# Patient Record
Sex: FEMALE | Race: WHITE | Employment: OTHER | URBAN - METROPOLITAN AREA
[De-identification: names, ages, dates, MRNs, and addresses within clinical notes are randomized per-mention and may not be internally consistent; named-entity substitution may affect disease eponyms.]

---

## 2017-05-09 ENCOUNTER — HOSPITAL ENCOUNTER (OUTPATIENT)
Dept: PREADMISSION TESTING | Age: 77
Discharge: HOME | End: 2017-05-09
Payer: MEDICARE

## 2017-05-09 ENCOUNTER — HOSPITAL ENCOUNTER (OUTPATIENT)
Dept: GENERAL RADIOLOGY | Age: 77
Discharge: HOME | End: 2017-05-09
Payer: MEDICARE

## 2017-05-09 VITALS
TEMPERATURE: 98.1 F | DIASTOLIC BLOOD PRESSURE: 81 MMHG | HEART RATE: 64 BPM | SYSTOLIC BLOOD PRESSURE: 146 MMHG | WEIGHT: 116 LBS | HEIGHT: 65 IN | BODY MASS INDEX: 19.33 KG/M2

## 2017-05-09 DIAGNOSIS — Z01.811 PRE-OP CHEST EXAM: ICD-10-CM

## 2017-05-09 LAB
ANION GAP BLD CALC-SCNC: 6 MMOL/L (ref 5–15)
ATRIAL RATE: 63 BPM
BUN SERPL-MCNC: 20 MG/DL (ref 6–20)
BUN/CREAT SERPL: 24 (ref 12–20)
CALCIUM SERPL-MCNC: 11.1 MG/DL (ref 8.5–10.1)
CALCULATED P AXIS, ECG09: 69 DEGREES
CALCULATED R AXIS, ECG10: -22 DEGREES
CALCULATED T AXIS, ECG11: 31 DEGREES
CHLORIDE SERPL-SCNC: 104 MMOL/L (ref 97–108)
CO2 SERPL-SCNC: 30 MMOL/L (ref 21–32)
CREAT SERPL-MCNC: 0.85 MG/DL (ref 0.55–1.02)
DIAGNOSIS, 93000: NORMAL
ERYTHROCYTE [DISTWIDTH] IN BLOOD BY AUTOMATED COUNT: 12.7 % (ref 11.5–14.5)
GLUCOSE SERPL-MCNC: 93 MG/DL (ref 65–100)
HCT VFR BLD AUTO: 41 % (ref 35–47)
HGB BLD-MCNC: 13.7 G/DL (ref 11.5–16)
MCH RBC QN AUTO: 31.6 PG (ref 26–34)
MCHC RBC AUTO-ENTMCNC: 33.4 G/DL (ref 30–36.5)
MCV RBC AUTO: 94.7 FL (ref 80–99)
P-R INTERVAL, ECG05: 174 MS
PLATELET # BLD AUTO: 298 K/UL (ref 150–400)
POTASSIUM SERPL-SCNC: 4.3 MMOL/L (ref 3.5–5.1)
Q-T INTERVAL, ECG07: 398 MS
QRS DURATION, ECG06: 78 MS
QTC CALCULATION (BEZET), ECG08: 407 MS
RBC # BLD AUTO: 4.33 M/UL (ref 3.8–5.2)
SODIUM SERPL-SCNC: 140 MMOL/L (ref 136–145)
VENTRICULAR RATE, ECG03: 63 BPM
WBC # BLD AUTO: 6.5 K/UL (ref 3.6–11)

## 2017-05-09 PROCEDURE — 36415 COLL VENOUS BLD VENIPUNCTURE: CPT | Performed by: COLON & RECTAL SURGERY

## 2017-05-09 PROCEDURE — 93005 ELECTROCARDIOGRAM TRACING: CPT

## 2017-05-09 PROCEDURE — 85027 COMPLETE CBC AUTOMATED: CPT | Performed by: COLON & RECTAL SURGERY

## 2017-05-09 PROCEDURE — 80048 BASIC METABOLIC PNL TOTAL CA: CPT | Performed by: COLON & RECTAL SURGERY

## 2017-05-09 PROCEDURE — 71020 XR CHEST PA LAT: CPT

## 2017-05-09 RX ORDER — ZINC GLUCONATE 10 MG
LOZENGE ORAL DAILY
COMMUNITY
End: 2022-03-23

## 2017-05-09 NOTE — PERIOP NOTES
PATIENT GIVEN \"FAQ'S ABOUT SURGICAL SITE INFECTIONS\" INFORMATION SHEET, EMPHASIZING THE IMPORTANCE OF GOOD HAND HYGIENE.   BOWEL PREP PER DR HODGSON

## 2017-05-10 NOTE — PERIOP NOTES
EKG done 5-9-17 reviewed by Dr. Donna Tucker - Anesthesia and approved for surgery scheduled 5-16-17.

## 2017-05-16 ENCOUNTER — ANESTHESIA EVENT (OUTPATIENT)
Dept: SURGERY | Age: 77
End: 2017-05-16
Payer: MEDICARE

## 2017-05-16 ENCOUNTER — ANESTHESIA (OUTPATIENT)
Dept: SURGERY | Age: 77
End: 2017-05-16
Payer: MEDICARE

## 2017-05-16 ENCOUNTER — HOSPITAL ENCOUNTER (OUTPATIENT)
Age: 77
Setting detail: OUTPATIENT SURGERY
Discharge: HOME | End: 2017-05-16
Attending: COLON & RECTAL SURGERY | Admitting: COLON & RECTAL SURGERY
Payer: MEDICARE

## 2017-05-16 VITALS
BODY MASS INDEX: 19.33 KG/M2 | DIASTOLIC BLOOD PRESSURE: 60 MMHG | HEIGHT: 65 IN | RESPIRATION RATE: 18 BRPM | WEIGHT: 116 LBS | OXYGEN SATURATION: 100 % | TEMPERATURE: 98 F | SYSTOLIC BLOOD PRESSURE: 120 MMHG | HEART RATE: 68 BPM

## 2017-05-16 PROCEDURE — 74011250636 HC RX REV CODE- 250/636

## 2017-05-16 PROCEDURE — 77030031139 HC SUT VCRL2 J&J -A: Performed by: COLON & RECTAL SURGERY

## 2017-05-16 PROCEDURE — 76210000021 HC REC RM PH II 0.5 TO 1 HR: Performed by: COLON & RECTAL SURGERY

## 2017-05-16 PROCEDURE — 76210000006 HC OR PH I REC 0.5 TO 1 HR: Performed by: COLON & RECTAL SURGERY

## 2017-05-16 PROCEDURE — 77030026438 HC STYL ET INTUB CARD -A: Performed by: ANESTHESIOLOGY

## 2017-05-16 PROCEDURE — 74011000250 HC RX REV CODE- 250: Performed by: COLON & RECTAL SURGERY

## 2017-05-16 PROCEDURE — 88304 TISSUE EXAM BY PATHOLOGIST: CPT | Performed by: COLON & RECTAL SURGERY

## 2017-05-16 PROCEDURE — 76060000033 HC ANESTHESIA 1 TO 1.5 HR: Performed by: COLON & RECTAL SURGERY

## 2017-05-16 PROCEDURE — 77030018836 HC SOL IRR NACL ICUM -A: Performed by: COLON & RECTAL SURGERY

## 2017-05-16 PROCEDURE — 77030008684 HC TU ET CUF COVD -B: Performed by: ANESTHESIOLOGY

## 2017-05-16 PROCEDURE — 76010000149 HC OR TIME 1 TO 1.5 HR: Performed by: COLON & RECTAL SURGERY

## 2017-05-16 PROCEDURE — 77030011640 HC PAD GRND REM COVD -A: Performed by: COLON & RECTAL SURGERY

## 2017-05-16 PROCEDURE — 74011000272 HC RX REV CODE- 272: Performed by: COLON & RECTAL SURGERY

## 2017-05-16 PROCEDURE — 74011250637 HC RX REV CODE- 250/637: Performed by: COLON & RECTAL SURGERY

## 2017-05-16 PROCEDURE — 77030002888 HC SUT CHRMC J&J -A: Performed by: COLON & RECTAL SURGERY

## 2017-05-16 PROCEDURE — 74011000250 HC RX REV CODE- 250

## 2017-05-16 PROCEDURE — 77030032490 HC SLV COMPR SCD KNE COVD -B: Performed by: COLON & RECTAL SURGERY

## 2017-05-16 RX ORDER — PHENYLEPHRINE HCL IN 0.9% NACL 0.4MG/10ML
SYRINGE (ML) INTRAVENOUS AS NEEDED
Status: DISCONTINUED | OUTPATIENT
Start: 2017-05-16 | End: 2017-05-16 | Stop reason: HOSPADM

## 2017-05-16 RX ORDER — SODIUM CHLORIDE 0.9 % (FLUSH) 0.9 %
5-10 SYRINGE (ML) INJECTION AS NEEDED
Status: DISCONTINUED | OUTPATIENT
Start: 2017-05-16 | End: 2017-05-16 | Stop reason: HOSPADM

## 2017-05-16 RX ORDER — KETOROLAC TROMETHAMINE 30 MG/ML
INJECTION, SOLUTION INTRAMUSCULAR; INTRAVENOUS AS NEEDED
Status: DISCONTINUED | OUTPATIENT
Start: 2017-05-16 | End: 2017-05-16 | Stop reason: HOSPADM

## 2017-05-16 RX ORDER — SODIUM CHLORIDE, SODIUM LACTATE, POTASSIUM CHLORIDE, CALCIUM CHLORIDE 600; 310; 30; 20 MG/100ML; MG/100ML; MG/100ML; MG/100ML
INJECTION, SOLUTION INTRAVENOUS
Status: DISCONTINUED | OUTPATIENT
Start: 2017-05-16 | End: 2017-05-16 | Stop reason: HOSPADM

## 2017-05-16 RX ORDER — FENTANYL CITRATE 50 UG/ML
25 INJECTION, SOLUTION INTRAMUSCULAR; INTRAVENOUS
Status: DISCONTINUED | OUTPATIENT
Start: 2017-05-16 | End: 2017-05-16 | Stop reason: HOSPADM

## 2017-05-16 RX ORDER — SODIUM CHLORIDE 0.9 % (FLUSH) 0.9 %
5-10 SYRINGE (ML) INJECTION EVERY 8 HOURS
Status: DISCONTINUED | OUTPATIENT
Start: 2017-05-16 | End: 2017-05-16 | Stop reason: HOSPADM

## 2017-05-16 RX ORDER — HYDROMORPHONE HYDROCHLORIDE 1 MG/ML
0.2 INJECTION, SOLUTION INTRAMUSCULAR; INTRAVENOUS; SUBCUTANEOUS
Status: DISCONTINUED | OUTPATIENT
Start: 2017-05-16 | End: 2017-05-16 | Stop reason: HOSPADM

## 2017-05-16 RX ORDER — MIDAZOLAM HYDROCHLORIDE 1 MG/ML
1 INJECTION, SOLUTION INTRAMUSCULAR; INTRAVENOUS AS NEEDED
Status: DISCONTINUED | OUTPATIENT
Start: 2017-05-16 | End: 2017-05-16 | Stop reason: HOSPADM

## 2017-05-16 RX ORDER — DEXAMETHASONE SODIUM PHOSPHATE 4 MG/ML
INJECTION, SOLUTION INTRA-ARTICULAR; INTRALESIONAL; INTRAMUSCULAR; INTRAVENOUS; SOFT TISSUE AS NEEDED
Status: DISCONTINUED | OUTPATIENT
Start: 2017-05-16 | End: 2017-05-16 | Stop reason: HOSPADM

## 2017-05-16 RX ORDER — LIDOCAINE HYDROCHLORIDE 20 MG/ML
INJECTION, SOLUTION EPIDURAL; INFILTRATION; INTRACAUDAL; PERINEURAL AS NEEDED
Status: DISCONTINUED | OUTPATIENT
Start: 2017-05-16 | End: 2017-05-16 | Stop reason: HOSPADM

## 2017-05-16 RX ORDER — DIPHENHYDRAMINE HYDROCHLORIDE 50 MG/ML
12.5 INJECTION, SOLUTION INTRAMUSCULAR; INTRAVENOUS AS NEEDED
Status: DISCONTINUED | OUTPATIENT
Start: 2017-05-16 | End: 2017-05-16 | Stop reason: HOSPADM

## 2017-05-16 RX ORDER — FENTANYL CITRATE 50 UG/ML
INJECTION, SOLUTION INTRAMUSCULAR; INTRAVENOUS AS NEEDED
Status: DISCONTINUED | OUTPATIENT
Start: 2017-05-16 | End: 2017-05-16 | Stop reason: HOSPADM

## 2017-05-16 RX ORDER — OXYCODONE HYDROCHLORIDE 5 MG/1
5-10 TABLET ORAL
Qty: 50 TAB | Refills: 0 | Status: SHIPPED | OUTPATIENT
Start: 2017-05-16 | End: 2022-03-23

## 2017-05-16 RX ORDER — SODIUM CHLORIDE, SODIUM LACTATE, POTASSIUM CHLORIDE, CALCIUM CHLORIDE 600; 310; 30; 20 MG/100ML; MG/100ML; MG/100ML; MG/100ML
75 INJECTION, SOLUTION INTRAVENOUS CONTINUOUS
Status: DISCONTINUED | OUTPATIENT
Start: 2017-05-16 | End: 2017-05-16 | Stop reason: HOSPADM

## 2017-05-16 RX ORDER — BUPIVACAINE HYDROCHLORIDE AND EPINEPHRINE 2.5; 5 MG/ML; UG/ML
30 INJECTION, SOLUTION EPIDURAL; INFILTRATION; INTRACAUDAL; PERINEURAL ONCE
Status: COMPLETED | OUTPATIENT
Start: 2017-05-16 | End: 2017-05-16

## 2017-05-16 RX ORDER — ROCURONIUM BROMIDE 10 MG/ML
INJECTION, SOLUTION INTRAVENOUS AS NEEDED
Status: DISCONTINUED | OUTPATIENT
Start: 2017-05-16 | End: 2017-05-16 | Stop reason: HOSPADM

## 2017-05-16 RX ORDER — PROPOFOL 10 MG/ML
INJECTION, EMULSION INTRAVENOUS AS NEEDED
Status: DISCONTINUED | OUTPATIENT
Start: 2017-05-16 | End: 2017-05-16 | Stop reason: HOSPADM

## 2017-05-16 RX ORDER — ACETAMINOPHEN 10 MG/ML
INJECTION, SOLUTION INTRAVENOUS AS NEEDED
Status: DISCONTINUED | OUTPATIENT
Start: 2017-05-16 | End: 2017-05-16 | Stop reason: HOSPADM

## 2017-05-16 RX ORDER — ROPIVACAINE HYDROCHLORIDE 5 MG/ML
30 INJECTION, SOLUTION EPIDURAL; INFILTRATION; PERINEURAL
Status: DISCONTINUED | OUTPATIENT
Start: 2017-05-16 | End: 2017-05-16 | Stop reason: HOSPADM

## 2017-05-16 RX ORDER — LIDOCAINE HYDROCHLORIDE 10 MG/ML
0.1 INJECTION, SOLUTION EPIDURAL; INFILTRATION; INTRACAUDAL; PERINEURAL AS NEEDED
Status: DISCONTINUED | OUTPATIENT
Start: 2017-05-16 | End: 2017-05-16 | Stop reason: HOSPADM

## 2017-05-16 RX ORDER — SODIUM CHLORIDE, SODIUM LACTATE, POTASSIUM CHLORIDE, CALCIUM CHLORIDE 600; 310; 30; 20 MG/100ML; MG/100ML; MG/100ML; MG/100ML
125 INJECTION, SOLUTION INTRAVENOUS CONTINUOUS
Status: DISCONTINUED | OUTPATIENT
Start: 2017-05-16 | End: 2017-05-16 | Stop reason: HOSPADM

## 2017-05-16 RX ORDER — MIDAZOLAM HYDROCHLORIDE 1 MG/ML
0.5 INJECTION, SOLUTION INTRAMUSCULAR; INTRAVENOUS
Status: DISCONTINUED | OUTPATIENT
Start: 2017-05-16 | End: 2017-05-16 | Stop reason: HOSPADM

## 2017-05-16 RX ORDER — SODIUM CHLORIDE 9 MG/ML
25 INJECTION, SOLUTION INTRAVENOUS CONTINUOUS
Status: DISCONTINUED | OUTPATIENT
Start: 2017-05-16 | End: 2017-05-16 | Stop reason: HOSPADM

## 2017-05-16 RX ORDER — SUCCINYLCHOLINE CHLORIDE 20 MG/ML
INJECTION INTRAMUSCULAR; INTRAVENOUS AS NEEDED
Status: DISCONTINUED | OUTPATIENT
Start: 2017-05-16 | End: 2017-05-16 | Stop reason: HOSPADM

## 2017-05-16 RX ORDER — MORPHINE SULFATE 10 MG/ML
2 INJECTION, SOLUTION INTRAMUSCULAR; INTRAVENOUS
Status: DISCONTINUED | OUTPATIENT
Start: 2017-05-16 | End: 2017-05-16 | Stop reason: HOSPADM

## 2017-05-16 RX ORDER — FENTANYL CITRATE 50 UG/ML
50 INJECTION, SOLUTION INTRAMUSCULAR; INTRAVENOUS AS NEEDED
Status: DISCONTINUED | OUTPATIENT
Start: 2017-05-16 | End: 2017-05-16 | Stop reason: HOSPADM

## 2017-05-16 RX ORDER — ONDANSETRON 2 MG/ML
INJECTION INTRAMUSCULAR; INTRAVENOUS AS NEEDED
Status: DISCONTINUED | OUTPATIENT
Start: 2017-05-16 | End: 2017-05-16 | Stop reason: HOSPADM

## 2017-05-16 RX ORDER — OXYCODONE HYDROCHLORIDE 5 MG/1
5 TABLET ORAL ONCE
Status: COMPLETED | OUTPATIENT
Start: 2017-05-16 | End: 2017-05-16

## 2017-05-16 RX ADMIN — Medication 80 MCG: at 18:05

## 2017-05-16 RX ADMIN — KETOROLAC TROMETHAMINE 30 MG: 30 INJECTION, SOLUTION INTRAMUSCULAR; INTRAVENOUS at 18:11

## 2017-05-16 RX ADMIN — SUCCINYLCHOLINE CHLORIDE 120 MG: 20 INJECTION INTRAMUSCULAR; INTRAVENOUS at 17:28

## 2017-05-16 RX ADMIN — PROPOFOL 100 MG: 10 INJECTION, EMULSION INTRAVENOUS at 17:28

## 2017-05-16 RX ADMIN — Medication 80 MCG: at 18:10

## 2017-05-16 RX ADMIN — FENTANYL CITRATE 50 MCG: 50 INJECTION, SOLUTION INTRAMUSCULAR; INTRAVENOUS at 17:28

## 2017-05-16 RX ADMIN — SODIUM CHLORIDE, SODIUM LACTATE, POTASSIUM CHLORIDE, CALCIUM CHLORIDE: 600; 310; 30; 20 INJECTION, SOLUTION INTRAVENOUS at 17:23

## 2017-05-16 RX ADMIN — DEXAMETHASONE SODIUM PHOSPHATE 4 MG: 4 INJECTION, SOLUTION INTRA-ARTICULAR; INTRALESIONAL; INTRAMUSCULAR; INTRAVENOUS; SOFT TISSUE at 17:43

## 2017-05-16 RX ADMIN — Medication 80 MCG: at 17:40

## 2017-05-16 RX ADMIN — ROCURONIUM BROMIDE 5 MG: 10 INJECTION, SOLUTION INTRAVENOUS at 17:28

## 2017-05-16 RX ADMIN — OXYCODONE HYDROCHLORIDE 5 MG: 5 TABLET ORAL at 18:50

## 2017-05-16 RX ADMIN — ACETAMINOPHEN 1000 MG: 10 INJECTION, SOLUTION INTRAVENOUS at 17:40

## 2017-05-16 RX ADMIN — PROPOFOL 50 MG: 10 INJECTION, EMULSION INTRAVENOUS at 17:35

## 2017-05-16 RX ADMIN — LIDOCAINE HYDROCHLORIDE 100 MG: 20 INJECTION, SOLUTION EPIDURAL; INFILTRATION; INTRACAUDAL; PERINEURAL at 17:28

## 2017-05-16 RX ADMIN — ONDANSETRON 4 MG: 2 INJECTION INTRAMUSCULAR; INTRAVENOUS at 17:43

## 2017-05-16 NOTE — H&P
History and Physical (outpatient)    Patient: Ruthie Alejandra 78 y.o. female     Referring Physician:  No ref. provider found    Chief Complaint:  Hemorrhoids. History of Present Illness: The patient is 51-year-old female who reports that she has been having problems with hemorrhoids since last year. They are uncomfortable and always protruding. She experiences hygiene problems. The level of discomfort fluctuates but has not been significantly reduced by the use of creams, suppositories, and sitz baths. She usually moves her bowels one once every other day. The stools are sometimes hard and she sometimes has to strain. Approximately once per week she uses an over-the-counter laxative. She saw a small amount of blood with some of her bowel movements in December and January, but she did not have any pain. She denies experiencing any fecal incontinence. She has had two colonoscopies within the last seven years. One was on 11/15/2010 and the other was on 6/15/2016. Hyperplastic polyps were removed during each of these procedures. Examination in the office on 5/4/2017 revealed significant hemorrhoidal enlargement consistent with the patient's symptoms. History:  Past Medical History:   Diagnosis Date    Arthritis     back, neck, hands    Chronic pain     fibromyalgia - neck/hands    Fibromyalgia     History of breast cancer 1997    Left breast cancer treated with surgery and radiation.  Hyperlipemia     Hypertension     Other ill-defined conditions     IBD - no problem with this at this time    Vaginal delivery     Four times. Four episiotomies.        Past Surgical History:   Procedure Laterality Date    BREAST SURGERY PROCEDURE UNLISTED  1997    L lumpectomy x2    COLONOSCOPY N/A 6/15/2016    COL[facility] ENDOSCOPY    HX APPENDECTOMY      HX GI      colonoscopies - last 2010    HX GYN  1989    hysterectomy    HX HEENT      childhood tonsillectomy    HX OTHER SURGICAL      Hemorrhoidectomy performed in a doctor's office circa 1992.  VASCULAR SURGERY PROCEDURE UNLIST  2000    vein sgy on L leg       Family History   Problem Relation Age of Onset    Heart Disease Mother     Cancer Father      colon    Colon Cancer Father     Heart Disease Brother      mi x2    Heart Attack Brother     Anesth Problems Neg Hx      Social History     Social History    Marital status:      Spouse name: N/A    Number of children: N/A    Years of education: N/A     Occupational History    Not on file. Social History Main Topics    Smoking status: Former Smoker     Packs/day: 0.25     Quit date: 11/3/1984    Smokeless tobacco: Never Used    Alcohol use 1.5 oz/week     3 Standard drinks or equivalent per week      Comment: occasional    Drug use: No    Sexual activity: Not on file     Other Topics Concern    Not on file     Social History Narrative       Allergies: Allergies   Allergen Reactions    Augmentin [Amoxicillin-Pot Clavulanate] Diarrhea    Indocin [Indomethacin Sodium] Nausea and Vomiting    Tolectin 600 Swelling       Current Medications:  Cannot display prior to admission medications because the patient has not been admitted in this contact. No current facility-administered medications for this encounter. Current Outpatient Prescriptions   Medication Sig    magnesium 250 mg tab Take  by mouth daily.  GLUC/CHND/OM3/DHA/EPA/FISH/STR (GLUCOSAMINE CHONDROITIN PLUS PO) Take  by mouth daily.  folic acid 535 mcg tablet Take 400 mcg by mouth daily.  DOCOSAHEXANOIC ACID/EPA (FISH OIL PO) Take 600 mg by mouth daily.  Potassium Gluconate 595 mg (99 mg) tablet Take 595 mg by mouth daily.  spironolactone (ALDACTONE) 50 mg tablet Take 50 mg by mouth daily.  MULTIVITAMIN PO Take  by mouth. Takes one po once daily.  LACTOBACILLUS ACIDOPHILUS (PROBIOTIC PO) Take  by mouth. Takes one po once daily.     metoprolol (LOPRESSOR) 50 mg tablet Take 50 mg by mouth daily. Indications: HYPERTENSION    atorvastatin (LIPITOR) 10 mg tablet Take 10 mg by mouth daily. Indications: HYPERCHOLESTEROLEMIA    aspirin (ASPIRIN) 325 mg tablet Take 650 mg by mouth as needed. Indications: MYOCARDIAL INFARCTION PREVENTION    ibuprofen (MOTRIN IB) 200 mg tablet Take 400-600 mg by mouth as needed. Indications: PAIN            Physical Exam:  There were no vitals taken for this visit. GENERAL:  No apparent distress. LUNGS:  Clear to auscultation bilaterally. HEART:  Regular rate and rhythm with no murmurs, gallops, or rubs. NEUROLOGIC: Alert and oriented. No gross deficits. Alerts:      Laboratory:    No results for input(s): HGB, HCT, WBC, PLT, INR, BUN, CREA, K, CRCLT, HGBEXT, HCTEXT, PLTEXT in the last 72 hours. No lab exists for component: PTT, PT, INREXT    Assessment and Plan:  Hemorrhoidectomy is indicated for relief of the patient's symptoms. The risks have been discussed in detail, and the patient has agreed to proceed.

## 2017-05-16 NOTE — PERIOP NOTES
Patient: Channing Delgado MRN: [id number]  SSN: [id number]   YOB: 1940  Age: 78 y.o. Sex: female     Patient is status post Procedure(s): HEMORRHOIDECTOMY . Surgeon(s) and Role:     * Gm Burleson MD - Primary    Local/Dose/Irrigation:  10mL 0.25% marcaine with epinephrine                  Peripheral IV 05/16/17 Left Arm (Active)   Site Assessment Clean, dry, & intact 5/16/2017  2:00 PM   Phlebitis Assessment 0 5/16/2017  2:00 PM   Infiltration Assessment 0 5/16/2017  2:00 PM   Dressing Status Clean, dry, & intact 5/16/2017  2:00 PM   Dressing Type Tape;Transparent 5/16/2017  2:00 PM   Hub Color/Line Status Pink; Infusing 5/16/2017  2:00 PM   Action Taken Open ports on tubing capped 5/16/2017  2:00 PM   Alcohol Cap Used Yes 5/16/2017  2:00 PM            Airway - Endotracheal Tube 05/16/17 Oral (Active)                   Dressing/Packing:  Wound Anus-DRESSING TYPE: 4 x 4;ABD pad;Xeroform (05/16/17 1815)  Splint/Cast:  ]    Other:

## 2017-05-16 NOTE — DISCHARGE INSTRUCTIONS
DISCHARGE SUMMARY from Nurse      PATIENT INSTRUCTIONS:    After general anesthesia or intravenous sedation, for 24 hours or while taking prescription Narcotics:  · Limit your activities  · Do not drive and operate hazardous machinery  · Do not make important personal or business decisions  · Do  not drink alcoholic beverages  · If you have not urinated within 8 hours after discharge, please contact your surgeon on call. Report the following to your surgeon:  · Excessive pain, swelling, redness or odor of or around the surgical area  · Temperature over 100.5  · Nausea and vomiting lasting longer than 4 hours or if unable to take medications  · Any signs of decreased circulation or nerve impairment to extremity: change in color, persistent  numbness, tingling, coldness or increase pain  · Any questions        What to do at Home:  Recommended activity: {discharge activity:81467}, ***    If you experience any of the following symptoms ***, please follow up with ***. *  Please give a list of your current medications to your Primary Care Provider. *  Please update this list whenever your medications are discontinued, doses are      changed, or new medications (including over-the-counter products) are added. *  Please carry medication information at all times in case of emergency situations. These are general instructions for a healthy lifestyle:    No smoking/ No tobacco products/ Avoid exposure to second hand smoke    Surgeon General's Warning:  Quitting smoking now greatly reduces serious risk to your health.     Obesity, smoking, and sedentary lifestyle greatly increases your risk for illness    A healthy diet, regular physical exercise & weight monitoring are important for maintaining a healthy lifestyle    You may be retaining fluid if you have a history of heart failure or if you experience any of the following symptoms:  Weight gain of 3 pounds or more overnight or 5 pounds in a week, increased swelling in our hands or feet or shortness of breath while lying flat in bed. Please call your doctor as soon as you notice any of these symptoms; do not wait until your next office visit. Recognize signs and symptoms of STROKE:    F-face looks uneven    A-arms unable to move or move unevenly    S-speech slurred or non-existent    T-time-call 911 as soon as signs and symptoms begin-DO NOT go       Back to bed or wait to see if you get better-TIME IS BRAIN. Warning Signs of HEART ATTACK     Call 911 if you have these symptoms:   Chest discomfort. Most heart attacks involve discomfort in the center of the chest that lasts more than a few minutes, or that goes away and comes back. It can feel like uncomfortable pressure, squeezing, fullness, or pain.  Discomfort in other areas of the upper body. Symptoms can include pain or discomfort in one or both arms, the back, neck, jaw, or stomach.  Shortness of breath with or without chest discomfort.  Other signs may include breaking out in a cold sweat, nausea, or lightheadedness. Don't wait more than five minutes to call 911 - MINUTES MATTER! Fast action can save your life. Calling 911 is almost always the fastest way to get lifesaving treatment. Emergency Medical Services staff can begin treatment when they arrive -- up to an hour sooner than if someone gets to the hospital by car. The discharge information has been reviewed with the {PATIENT PARENT GUARDIAN:45461}. The {PATIENT PARENT GUARDIAN:02267} verbalized understanding. Discharge medications reviewed with the {Dishcarge meds reviewed CZIL:21776} and appropriate educational materials and side effects teaching were provided. Post-Operative Instructions      1. Diet:  Consume a high fiber diet as tolerated. Such a diet may include fresh fruits, vegetables, and whole grain cereals and breads.   You should also drink 8-10 glasses of water, juice, or other non-alcoholic beverages per day. 2. Fiber Supplements:  Take 1 dose of Metamucil or other over-the-counter fiber supplement (Citrucel, BeneFiber, etc.) as directed each morning and another dose each evening. 3. Medications: Take pain medication as prescribed. Do not drive, drink alcohol, or operate machinery while taking prescription pain medication. Take docusate (non-prescription stool softener) twice per day as directed. Beginning on the second day after surgery, you may take ibuprofen as directed in addition to or instead of the prescription medication if there has been no significant bleeding. Do not take pain medication on an empty stomach. Do not take aspirin for 10 days following the procedure. 4. Activity:  Do not engage in any heavy lifting or other strenuous activity until you have been seen for follow-up. You may walk as much as you want, and you may climb stairs. Frequent walking will help prevent constipation. 5. Sitz Baths (soaking):  Remove the dressing on the first day after surgery or just before your next bowel movement (whichever comes first), then begin performing sitz baths 3 times per day and after bowel movements. The water should be as hot as you can tolerate, and you should soak for no longer than 20 minutes at a time. Do not wipe the anal area with dry toilet tissue; instead, use baby wipes. After sitz baths, cover the anal area with a gauze dressing. You may also apply Neosporin, Neosporin + Pain Relief, or a non-prescription a topical anesthetic such as lidocaine or dibucaine. 6. Constipation:  If more than one day passes without a bowel movement, take 1 Tablespoon of Milk of Magnesia. Repeat in 6 hours if you have no results. If taking the second dose of the Milk of Magnesia does not result in a bowel movement, take two to four 5 mg Dulcolax (bisacodyl) tablets. If you have not had a bowel movement by the next morning after taking the bisacodyl, call my office.     7. Bleeding:  A small amount of bright red bleeding can be expected for the next several days. If bleeding appears to be continuous, call my office. 8. Other Problems:  If you experience intolerable pain, fever (temperature of 101 or more), or inability to urinate, call my office. 9. Questions: If you have any questions about your post-operative condition or care, do not hesitate to call my office. 10. Other: For anything other than scheduling, please call [phone]. 11.  Follow-up:  Please return to my office at 9:30 AM on Tuesday 5/30/2017. If you need to change the appointment, please call [phone] on a weekday between 9:00 AM and noon.       Casie Damian M.D.  [phone]

## 2017-05-16 NOTE — BRIEF OP NOTE
BRIEF OPERATIVE NOTE    Date of Procedure:  5/16/2017   Preoperative Diagnosis:  Hemorrhoids. Postoperative Diagnosis:  Hemorrhoids. Procedure:  Internal and external hemorrhoidectomies. Surgeon:  Cindy Lyon MD  Assistant:  Larry Moon  Anesthesia:  General endotracheal  Estimated Blood Loss:  25 mL  Crystalloid:  300 mL    Specimens:   ID Type Source Tests Collected by Time Destination   1 : Left Lateral Hemorrhoid Fresh Anus  Cindy Lyon MD 5/16/2017 1752 Pathology   2 : Right Posterior Hemorrhoid Fresh Anus  Cindy Lyon MD 5/16/2017 1801 Pathology     Tubes and Drains:  None. Findings: Somewhat enlarged internal and external hemorrhoids in the left lateral, anterior, and right posterior positions. Complications: None apparent.

## 2017-05-16 NOTE — PERIOP NOTES
Phylicia [phone] to patient's  (Rekha Cornelius) to update him about the patient's surgical status at start of procedure.

## 2017-05-16 NOTE — IP AVS SNAPSHOT
[address] [place] [address] 
[phone] Patient: Jessica Thompson 
MRN: [id number] MD You are allergic to the following Allergen Reactions Augmentin (Amoxicillin-Pot Clavulanate) Diarrhea Indocin (Indomethacin Sodium) Nausea and Vomiting Tolectin 600 Swelling Recent Documentation Height Weight BMI OB Status Smoking Status 1.651 m 52.6 kg 19.3 kg/m2 Hysterectomy Former Smoker Emergency Contacts Name Discharge Info Relation Home Work Mobile Neal Amaro DISCHARGE CAREGIVER [3] Spouse [3] [phone] About your hospitalization You were admitted on:  May 16, 2017 You last received care in the:  [address] [place] [address] PACU You were discharged on:  May 16, 2017 Unit phone number:  [phone] Why you were hospitalized Your primary diagnosis was:  Not on File Providers Seen During Your Hospitalizations Provider Role Specialty Primary office phone Alfred Delgado MD Attending Provider Colon and Rectal Surgery [phone] Your Primary Care Physician (PCP) Primary Care Physician Office Phone Office Fax OTHER, PHYS ** None ** ** None ** Follow-up Information Follow up With Details Comments Contact Info Aileen Chaney MD   Patient can only remember the practice name and not the physician Alfred Delgado MD Go on 2017 Appointment time 9:30AM [address] [place] [address] [address] 
[phone] Current Discharge Medication List  
  
START taking these medications Dose & Instructions Dispensing Information Comments Morning Noon Evening Bedtime  
 oxyCODONE IR 5 mg immediate release tablet Commonly known as:  Aster Pena Your last dose was: Your next dose is:    
   
   
 Dose:  5-10 mg Take 1-2 Tabs by mouth every four (4) hours as needed for Pain.  Max Daily Amount: 60 mg.  
 Quantity:  50 Tab Refills:  0 CONTINUE these medications which have NOT CHANGED Dose & Instructions Dispensing Information Comments Morning Noon Evening Bedtime  
 folic acid 039 mcg tablet Your last dose was: Your next dose is:    
   
   
 Dose:  400 mcg Take 400 mcg by mouth daily. Refills:  0 GLUCOSAMINE CHONDROITIN PLUS PO Your last dose was: Your next dose is: Take  by mouth daily. Refills:  0 LIPITOR 10 mg tablet Generic drug:  atorvastatin Your last dose was: Your next dose is:    
   
   
 Dose:  10 mg Take 10 mg by mouth daily. Indications: HYPERCHOLESTEROLEMIA Refills:  0  
     
   
   
   
  
 magnesium 250 mg Tab Your last dose was: Your next dose is: Take  by mouth daily. Refills:  0  
     
   
   
   
  
 metoprolol tartrate 50 mg tablet Commonly known as:  LOPRESSOR Your last dose was: Your next dose is:    
   
   
 Dose:  50 mg Take 50 mg by mouth daily. Indications: HYPERTENSION Refills:  0 MULTIVITAMIN PO Your last dose was: Your next dose is: Take  by mouth. Takes one po once daily. Refills:  0 Potassium Gluconate 595 mg (99 mg) tablet Your last dose was: Your next dose is:    
   
   
 Dose:  595 mg Take 595 mg by mouth daily. Refills:  0 PROBIOTIC PO Your last dose was: Your next dose is: Take  by mouth. Takes one po once daily. Refills:  0  
     
   
   
   
  
 spironolactone 50 mg tablet Commonly known as:  ALDACTONE Your last dose was: Your next dose is:    
   
   
 Dose:  50 mg Take 50 mg by mouth daily. Refills:  0 STOP taking these medications aspirin 325 mg tablet Commonly known as:  ASPIRIN  
   
  
 FISH OIL PO  
   
  
 MOTRIN  mg tablet Generic drug:  ibuprofen Where to Get Your Medications Information on where to get these meds will be given to you by the nurse or doctor. ! Ask your nurse or doctor about these medications  
  oxyCODONE IR 5 mg immediate release tablet Discharge Instructions DISCHARGE SUMMARY from Nurse PATIENT INSTRUCTIONS: 
 
 
F-face looks uneven A-arms unable to move or move unevenly S-speech slurred or non-existent T-time-call 911 as soon as signs and symptoms begin-DO NOT go Back to bed or wait to see if you get better-TIME IS BRAIN. Warning Signs of HEART ATTACK Call 911 if you have these symptoms: 
? Chest discomfort. Most heart attacks involve discomfort in the center of the chest that lasts more than a few minutes, or that goes away and comes back. It can feel like uncomfortable pressure, squeezing, fullness, or pain. ? Discomfort in other areas of the upper body. Symptoms can include pain or discomfort in one or both arms, the back, neck, jaw, or stomach. ? Shortness of breath with or without chest discomfort. ? Other signs may include breaking out in a cold sweat, nausea, or lightheadedness. Don't wait more than five minutes to call [address]! Fast action can save your life. Calling 911 is almost always the fastest way to get lifesaving treatment. Emergency Medical Services staff can begin treatment when they arrive  up to an hour sooner than if someone gets to the hospital by car.   
 
 
The discharge information has been reviewed with the {PATIENT PARENT GUARDIAN:58737}. The {PATIENT PARENT GUARDIAN:72811} verbalized understanding. Discharge medications reviewed with the {Dishcarge meds reviewed PDQE:07036} and appropriate educational materials and side effects teaching were provided. Post-Operative Instructions 1. Diet:  Consume a high fiber diet as tolerated. Such a diet may include fresh fruits, vegetables, and whole grain cereals and breads. You should also drink 8-10 glasses of water, juice, or other non-alcoholic beverages per day. 2. Fiber Supplements:  Take 1 dose of Metamucil or other over-the-counter fiber supplement (Citrucel, BeneFiber, etc.) as directed each morning and another dose each evening. 3. Medications: Take pain medication as prescribed. Do not drive, drink alcohol, or operate machinery while taking prescription pain medication. Take docusate (non-prescription stool softener) twice per day as directed. Beginning on the second day after surgery, you may take ibuprofen as directed in addition to or instead of the prescription medication if there has been no significant bleeding. Do not take pain medication on an empty stomach. Do not take aspirin for 10 days following the procedure. 4. Activity:  Do not engage in any heavy lifting or other strenuous activity until you have been seen for follow-up. You may walk as much as you want, and you may climb stairs. Frequent walking will help prevent constipation. 5. Sitz Baths (soaking):  Remove the dressing on the first day after surgery or just before your next bowel movement (whichever comes first), then begin performing sitz baths 3 times per day and after bowel movements. The water should be as hot as you can tolerate, and you should soak for no longer than 20 minutes at a time. Do not wipe the anal area with dry toilet tissue; instead, use baby wipes. After sitz baths, cover the anal area with a gauze dressing.   You may also apply Neosporin, Neosporin + Pain Relief, or a non-prescription a topical anesthetic such as lidocaine or dibucaine. 6. Constipation:  If more than one day passes without a bowel movement, take 1 Tablespoon of Milk of Magnesia. Repeat in 6 hours if you have no results. If taking the second dose of the Milk of Magnesia does not result in a bowel movement, take two to four 5 mg Dulcolax (bisacodyl) tablets. If you have not had a bowel movement by the next morning after taking the bisacodyl, call my office. 7. Bleeding:  A small amount of bright red bleeding can be expected for the next several days. If bleeding appears to be continuous, call my office. 8. Other Problems:  If you experience intolerable pain, fever (temperature of 101 or more), or inability to urinate, call my office. 9. Questions: If you have any questions about your post-operative condition or care, do not hesitate to call my office. 10. Other: For anything other than scheduling, please call [phone]. 11.  Follow-up:  Please return to my office at 9:30 AM on Tuesday 5/30/2017. If you need to change the appointment, please call [phone] on a weekday between 9:00 AM and noon. Ashwin García M.D. 
[phone] Discharge Orders None Introducing [facility]! [employer] introduces [employer] patient portal. Now you can access parts of your medical record, email your doctor's office, and request medication refills online. 1. In your internet browser, go to [email or web address] 2. Click on the First Time User? Click Here link in the Sign In box. You will see the New Member Sign Up page. 3. Enter your [employer] Access Code exactly as it appears below. You will not need to use this code after youve completed the sign-up process. If you do not sign up before the expiration date, you must request a new code. · [employer] Access Code: [email or web address] Expires: 8/7/2017  1:47 PM 
 
 4. Enter the last four digits of your Social Security Number (xxxx) and Date of Birth (mm/dd/yyyy) as indicated and click Submit. You will be taken to the next sign-up page. 5. Create a [employer] ID. This will be your [employer] login ID and cannot be changed, so think of one that is secure and easy to remember. 6. Create a [email or web address] password. You can change your password at any time. 7. Enter your Password Reset Question and Answer. This can be used at a later time if you forget your password. 8. Enter your e-mail address. You will receive e-mail notification when new information is available in [address] 9. Click Sign Up. You can now view and download portions of your medical record. 10. Click the Download Summary menu link to download a portable copy of your medical information. If you have questions, please visit the Frequently Asked Questions section of the [email or web address] website. Remember, [employer] is NOT to be used for urgent needs. For medical emergencies, dial 911. Now available from your iPhone and Android! General Information Please provide this summary of care documentation to your next provider. Patient Signature:  ____________________________________________________________ Date:  ____________________________________________________________  
  
Karen Brought Provider Signature:  ____________________________________________________________ Date:  ____________________________________________________________

## 2017-05-17 NOTE — ANESTHESIA POSTPROCEDURE EVALUATION
Post-Anesthesia Evaluation and Assessment    Patient: Kesha Ba MRN: [id number]  SSN: [id number]    YOB: 1940  Age: 78 y.o. Sex: female       Cardiovascular Function/Vital Signs  Visit Vitals    /60 (BP 1 Location: Right arm, BP Patient Position: At rest)    Pulse 68    Temp 36.7 °C (98 °F)    Resp 18    Ht 5' 5\" (1.651 m)    Wt 52.6 kg (116 lb)    SpO2 100%    BMI 19.3 kg/m2       Patient is status post general anesthesia for Procedure(s): HEMORRHOIDECTOMY . Nausea/Vomiting: None    Postoperative hydration reviewed and adequate. Pain:  Pain Scale 1: Numeric (0 - 10) (05/16/17 1951)  Pain Intensity 1: 0 (05/17/17 0939)   Managed    Neurological Status:   Neuro (WDL): Within Defined Limits (05/16/17 1900)  Neuro  Neurologic State: Alert (05/16/17 1900)  LUE Motor Response: Purposeful (05/16/17 1900)  LLE Motor Response: Purposeful (05/16/17 1900)  RUE Motor Response: Purposeful (05/16/17 1900)  RLE Motor Response: Purposeful (05/16/17 1900)   At baseline    Mental Status and Level of Consciousness: Arousable    Pulmonary Status:   O2 Device: Room air (05/16/17 1951)   Adequate oxygenation and airway patent    Complications related to anesthesia: None    Post-anesthesia assessment completed.  No concerns    Signed By: Adrián Bell MD     May 17, 2017

## 2017-05-18 NOTE — OP NOTES
[address]   [address] [facility], [address] [place] [address]   OP NOTE       Name:  Scott Corrigan   MR#:  [id number]   :  1940   Account #:  [de-identified]    Surgery Date:  2017   Date of Adm:  2017       PREOPERATIVE DIAGNOSIS: Hemorrhoids. POSTOPERATIVE DIAGNOSIS: Hemorrhoids. PROCEDURE PERFORMED: Internal and external hemorrhoidectomy. SURGEON: Hermelinda Donaldson MD.    ASSISTANT: Larry Lizama.    ANESTHESIA: General endotracheal.      SPECIMENS REMOVED:   1. Left lateral hemorrhoid. 2. Right posterior hemorrhoid. TUBES AND DRAINS: None. ESTIMATED BLOOD LOSS: 25 mL. CRYSTALLOID: 300 mL. INDICATIONS FOR PROCEDURE: The patient is a 82-year-old female who reports that she has been having problems with hemorrhoids since last year. They are uncomfortable and always protruding. She experiences hygiene problems. The level of discomfort fluctuates, but it has not been significantly reduced by the use of creams, suppositories and sitz baths. She usually moves her bowels once every other day. The stools are sometimes hard, and she sometimes has to strain. Approximately once per week she uses an over-the-counter laxative. She saw a small amount of blood with some of the bowel movements in December and January, she did not have any pain. She denies experiencing any fecal incontinence. She has had two colonoscopies within the last 7 years. One was on 11/15/2010 and the other was on 06/15/2016. Hyperplastic polyps were removed during each of these procedures. Examination in the office on 2015 revealed significant hemorrhoidal enlargement consistent with the patient's symptoms. Hemorrhoidectomy was indicated for relief of the patient's symptoms. The risks were discussed in detail, and the patient agreed to proceed.     OPERATIVE FINDINGS: There were somewhat enlarged internal and external hemorrhoids in the left lateral, anterior, and right posterior positions. DESCRIPTION OF PROCEDURE: After informed consent was obtained, the patient was taken to the operating room where standard monitoring devices were attached and adequate general endotracheal anesthesia was induced. She was then placed in the prone jackknife position on the operating table with all pressure points padded appropriately and with the lower extremities fitted with pneumatic compression stockings. The buttocks were retracted bilaterally using Mastisol and tape. The perineum was prepped and draped in the usual sterile fashion. Visual inspection and digital rectal examination were performed. Exposure within the anal canal was provided using the large Hill-Hdz retractor. The disease on the patient's left side was addressed first. Here, in the lateral position, the internal hemorrhoidal component was controlled by placing a 3-0 Vicryl figure-of-eight suture proximally. Sharp excision was then performed using scalpel and scissors to include both the internal and the external component. External cutaneous redundancy was reduced by trimming the skin once the subcutaneous portion of the dissection had been performed. The wound was closed using running interrupted 3-0 Vicryl sutures. Moving to the right posterior position, the same basic technique was employed to remove a similar amount of tissue. In this case, there was perhaps a bit more of an external component, and more of the skin was removed in order to allow for the closure of the wound without leaving a significant amount of redundancy. The wound was once again closed with running and interrupted 3-0 Vicryl sutures. The hemorrhoids in the anterior quadrant that were located within the anal canal, but distal to the dentate line, were dealt with by suture ligation using 3-0 Vicryl. The ligation was performed instead of excision in order to preserve more tissue and hopefully minimize the unpleasantness of the recovery.     Final inspection revealed good hemostasis. 0.25% Bupivacaine with epinephrine was infiltrated circumanally to provide some postoperative analgesia. Three small strips of Gelfoam were inserted into the anal canal, and an external dressing consisting of Xeroform, 4 x 4s, and an ABD was put in place. There were no apparent complications, and the patient appeared to have tolerated the procedure well. At its conclusion, she was extubated and transported in stable condition to the recovery room.         Bruno Lim MD PG / Jessica   D:  05/17/2017   23:22   T:  05/18/2017   09:04   Job #:  [id number]

## 2017-08-15 ENCOUNTER — HOSPITAL ENCOUNTER (OUTPATIENT)
Dept: MAMMOGRAPHY | Age: 77
Discharge: HOME | End: 2017-08-15
Attending: FAMILY MEDICINE
Payer: MEDICARE

## 2017-08-15 DIAGNOSIS — Z12.31 VISIT FOR SCREENING MAMMOGRAM: ICD-10-CM

## 2017-08-15 PROCEDURE — 77067 SCR MAMMO BI INCL CAD: CPT

## 2018-08-15 ENCOUNTER — HOSPITAL ENCOUNTER (OUTPATIENT)
Dept: MAMMOGRAPHY | Age: 78
Discharge: HOME | End: 2018-08-15
Payer: MEDICARE

## 2018-08-15 DIAGNOSIS — Z12.31 VISIT FOR SCREENING MAMMOGRAM: ICD-10-CM

## 2018-08-15 PROCEDURE — 77067 SCR MAMMO BI INCL CAD: CPT

## 2019-08-22 ENCOUNTER — HOSPITAL ENCOUNTER (OUTPATIENT)
Dept: MAMMOGRAPHY | Age: 79
Discharge: HOME | End: 2019-08-22
Attending: FAMILY MEDICINE
Payer: MEDICARE

## 2019-08-22 DIAGNOSIS — Z12.31 VISIT FOR SCREENING MAMMOGRAM: ICD-10-CM

## 2019-08-22 PROCEDURE — 77063 BREAST TOMOSYNTHESIS BI: CPT

## 2020-08-25 ENCOUNTER — HOSPITAL ENCOUNTER (OUTPATIENT)
Dept: MAMMOGRAPHY | Age: 80
Discharge: HOME | End: 2020-08-25
Attending: FAMILY MEDICINE
Payer: MEDICARE

## 2020-08-25 DIAGNOSIS — Z12.31 VISIT FOR SCREENING MAMMOGRAM: ICD-10-CM

## 2020-08-25 PROCEDURE — 77063 BREAST TOMOSYNTHESIS BI: CPT

## 2020-10-30 ENCOUNTER — TRANSCRIBE ORDER (OUTPATIENT)
Dept: SCHEDULING | Age: 80
End: 2020-10-30

## 2020-10-30 DIAGNOSIS — Z12.31 VISIT FOR SCREENING MAMMOGRAM: Primary | ICD-10-CM

## 2021-08-26 ENCOUNTER — HOSPITAL ENCOUNTER (OUTPATIENT)
Dept: MAMMOGRAPHY | Age: 81
Discharge: HOME | End: 2021-08-26
Attending: FAMILY MEDICINE
Payer: MEDICARE

## 2021-08-26 DIAGNOSIS — Z12.31 VISIT FOR SCREENING MAMMOGRAM: ICD-10-CM

## 2021-08-26 PROCEDURE — 77063 BREAST TOMOSYNTHESIS BI: CPT

## 2022-02-15 ENCOUNTER — HOSPITAL ENCOUNTER (OUTPATIENT)
Dept: LAB | Age: 82
Discharge: HOME | End: 2022-02-15

## 2022-02-15 ENCOUNTER — DOCUMENTATION ONLY (OUTPATIENT)
Dept: SURGERY | Age: 82
End: 2022-02-15

## 2022-02-15 ENCOUNTER — OFFICE VISIT (OUTPATIENT)
Dept: SURGERY | Age: 82
End: 2022-02-15
Payer: MEDICARE

## 2022-02-15 VITALS — BODY MASS INDEX: 20.14 KG/M2 | WEIGHT: 118 LBS | HEIGHT: 64 IN

## 2022-02-15 DIAGNOSIS — R93.89 ABNORMAL ULTRASOUND: Primary | ICD-10-CM

## 2022-02-15 DIAGNOSIS — N63.20 LEFT BREAST MASS: ICD-10-CM

## 2022-02-15 PROBLEM — C50.912 BREAST CANCER, LEFT (HCC): Status: ACTIVE | Noted: 2022-02-15

## 2022-02-15 PROCEDURE — 1090F PRES/ABSN URINE INCON ASSESS: CPT | Performed by: SURGERY

## 2022-02-15 PROCEDURE — G8536 NO DOC ELDER MAL SCRN: HCPCS | Performed by: SURGERY

## 2022-02-15 PROCEDURE — 19083 BX BREAST 1ST LESION US IMAG: CPT | Performed by: SURGERY

## 2022-02-15 PROCEDURE — G8427 DOCREV CUR MEDS BY ELIG CLIN: HCPCS | Performed by: SURGERY

## 2022-02-15 PROCEDURE — 99203 OFFICE O/P NEW LOW 30 MIN: CPT | Performed by: SURGERY

## 2022-02-15 PROCEDURE — G8432 DEP SCR NOT DOC, RNG: HCPCS | Performed by: SURGERY

## 2022-02-15 PROCEDURE — G8400 PT W/DXA NO RESULTS DOC: HCPCS | Performed by: SURGERY

## 2022-02-15 PROCEDURE — G8420 CALC BMI NORM PARAMETERS: HCPCS | Performed by: SURGERY

## 2022-02-15 PROCEDURE — 1101F PT FALLS ASSESS-DOCD LE1/YR: CPT | Performed by: SURGERY

## 2022-02-15 NOTE — PROGRESS NOTES
[facility]  OFFICE PROCEDURE PROGRESS NOTE        Chart reviewed for the following:   I, Dr. Louann Landau, have reviewed the History, Physical and updated the Allergic reactions for [facility] [address] [place] [address] performed immediately prior to start of procedure:   I, Dr. Louann Landau, have performed the following reviews on Isaac Garay prior to the start of the procedure:            * Patient was identified by name and date of birth   * Agreement on procedure being performed was verified  * Risks and Benefits explained to the patient  * Procedure site verified and marked as necessary  * Patient was positioned for comfort  * Consent was signed and verified     Time: 3:40pm       Date of procedure: 2/15/2022    Procedure performed by:  Louann Landau, MD    Provider assisted by: Lubna Carey MA    Patient assisted by: nursing attendant    How tolerated by patient: tolerated the procedure well with no complications    Post Procedural Pain Scale: 0 - No Hurt    Comments: none, Patient tolerated the procedure well without complications. Denies pain post biopsy.

## 2022-02-15 NOTE — PROGRESS NOTES
HISTORY OF PRESENT ILLNESS  Jesus Camarillo is a [de-identified] y.o. female. HPI NEW patient consult referred by  Dr. Ning Rosas for LEFT breast mass near the nipple. She hit the breast with a box while moving things but that was under her breast.     1997 LEFT lumpectomy, XRT    Family History:  Father had Colon cancer     Breast imaging-   [facility] Results (most recent):  Results from Hospital Encounter encounter on 08/26/21    [facility] 3D NISH W MAMMO BI SCREENING INCL CAD    Narrative  STUDY: Bilateral digital screening mammogram with 3-D tomosynthesis    INDICATION:  Screening. COMPARISON: Prior studies dating back to 2012    BREAST COMPOSITION: The breasts are heterogeneously dense, which may obscure  small masses. FINDINGS: Bilateral digital screening mammography was performed and is  interpreted in conjunction with a computer assisted detection (CAD) system. Additionally, tomosynthesis of both breasts in the CC and MLO projections was  performed. No suspicious masses or calcifications are identified. There are  chronic lumpectomy changes in the upper outer left breast. There has been no  significant change. Impression  BI-RADS 2: Benign. No mammographic evidence of malignancy. RECOMMENDATIONS:  Next screening mammogram is recommended in one year. The patient will be notified of these results. Past Medical History:   Diagnosis Date    Arthritis     back, neck, hands    Breast cancer ([address] 1997    Chronic pain     fibromyalgia - neck/hands    Fibromyalgia     History of breast cancer 1997    Left breast cancer treated with surgery and radiation.  Hyperlipemia     Hypertension     Other ill-defined conditions(799.89)     IBD - no problem with this at this time    S/P radiation therapy 1997    Vaginal delivery     Four times. Four episiotomies.      Past Surgical History:   Procedure Laterality Date    COLONOSCOPY N/A 6/15/2016    COLONOSCOPY performed by Zahida Cabral MD at [address] HX APPENDECTOMY      HX BREAST BIOPSY Left     stereo  benign    HX BREAST LUMPECTOMY Left     Ca.  HX GI      colonoscopies - last     HX GYN  1989    hysterectomy    HX HEENT      childhood tonsillectomy    HX OTHER SURGICAL      Hemorrhoidectomy performed in a doctor's office [address]  SC BREAST SURGERY PROCEDURE UNLISTED      L lumpectomy x2    VASCULAR SURGERY PROCEDURE UNLIST      vein sgy on L leg      OB History    No obstetric history on file. Family History   Problem Relation Age of Onset    Heart Disease Mother     Cancer Father         colon    Colon Cancer Father     Heart Disease Brother         mi x2    Heart Attack Brother     Anesth Problems Neg Hx      Social History     Tobacco Use    Smoking status: Former Smoker     Packs/day: 0.25     Quit date: 11/3/1984     Years since quittin.3    Smokeless tobacco: Never Used   Substance Use Topics    Alcohol use: Yes     Alcohol/week: 2.5 standard drinks     Types: 3 Standard drinks or equivalent per week     Comment: occasional      Prior to Admission medications    Medication Sig Start Date End Date Taking? Authorizing Provider   MULTIVITAMIN PO Take  by mouth. Takes one po once daily. Yes Provider, Historical   atorvastatin (LIPITOR) 10 mg tablet Take 10 mg by mouth daily. Indications: HYPERCHOLESTEROLEMIA   Yes Provider, Historical   oxyCODONE IR (ROXICODONE) 5 mg immediate release tablet Take 1-2 Tabs by mouth every four (4) hours as needed for Pain. Max Daily Amount: 60 mg. Patient not taking: Reported on 2/15/2022 5/16/17   Luci Mills MD   magnesium 250 mg tab Take  by mouth daily. Patient not taking: Reported on 2/15/2022    Provider, Historical   GLUC/CHND/OM3/DHA/EPA/FISH/STR (GLUCOSAMINE CHONDROITIN PLUS PO) Take  by mouth daily. Patient not taking: Reported on 2/15/2022    Provider, Historical   folic acid 748 mcg tablet Take 400 mcg by mouth daily.   Patient not taking: Reported on 2/15/2022    Provider, Historical   Potassium Gluconate 595 mg (99 mg) tablet Take 595 mg by mouth daily. Patient not taking: Reported on 2/15/2022    Provider, Historical   spironolactone (ALDACTONE) 50 mg tablet Take 50 mg by mouth daily. Patient not taking: Reported on 2/15/2022    Provider, Historical   LACTOBACILLUS ACIDOPHILUS (PROBIOTIC PO) Take  by mouth. Takes one po once daily. Patient not taking: Reported on 2/15/2022    Provider, Historical   metoprolol (LOPRESSOR) 50 mg tablet Take 50 mg by mouth daily. Indications: HYPERTENSION  Patient not taking: Reported on 2/15/2022 11/3/10   Provider, Historical      Allergies   Allergen Reactions    Augmentin [Amoxicillin-Pot Clavulanate] Diarrhea    Indocin [Indomethacin Sodium] Nausea and Vomiting    Tolectin 600 Swelling       ROS    Physical Exam  Constitutional:       Appearance: She is well-developed. Cardiovascular:      Rate and Rhythm: Normal rate and regular rhythm. Heart sounds: Normal heart sounds. Pulmonary:      Effort: Pulmonary effort is normal.      Breath sounds: Normal breath sounds. Chest:   Breasts: Breasts are symmetrical.      Right: No swelling, mass, nipple discharge, skin change, tenderness, axillary adenopathy or supraclavicular adenopathy. Left: Mass (dense mass 11:00/periareolar with skin retraction) present. No swelling, nipple discharge, skin change, tenderness, axillary adenopathy or supraclavicular adenopathy. Lymphadenopathy:      Upper Body:      Right upper body: No supraclavicular or axillary adenopathy. Left upper body: No supraclavicular or axillary adenopathy. Skin:     General: Skin is warm and dry. Neurological:      Mental Status: She is alert and oriented to person, place, and time. US - Guided Core Biopsy  Indication : Left Breast mass 11:00 periareolar. Palpable with skin retraction.   Ultrasound Findings:  LEFT 11:00 1cm heterogeneous hypoechoic horizontal mass, through transmission. LEFT axilla 1.2cm lymph node with normal fatty hilum   Prep : alcohol. Anesthesia : 1% lidocaine with epinephrine, 5cc. Device : The hand-held 10 gauge BARD needle was inserted through the lesion and captured tissue with real-time Ultrasound Confirmation. .   Core Sampling :  3 cores were obtained. Marker: clip placed   Dressing : Steristrips, gauze and tape. Instructions : Remove gauze this evening. Remove steristrips in one week. 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 Abnormal ultrasound  R93.89 793.99 SURGICAL PATHOLOGY      SURGICAL PATHOLOGY   2. Left breast mass  N63.20 611.72 SURGICAL PATHOLOGY      SURGICAL PATHOLOGY     Total time spent with patient: 30 minutes   LEFT breast mass biopsied. 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   Before surgery she will have a LEFT diagnostic mammogram and ultrasound  If + consider LEFT mammogram before surgery

## 2022-02-17 ENCOUNTER — TELEPHONE (OUTPATIENT)
Dept: SURGERY | Age: 82
End: 2022-02-17

## 2022-02-17 DIAGNOSIS — Z85.3 HISTORY OF BREAST CANCER IN FEMALE: Primary | ICD-10-CM

## 2022-02-17 DIAGNOSIS — N63.20 LEFT BREAST MASS: ICD-10-CM

## 2022-02-17 PROBLEM — C50.919 BREAST CANCER (HCC): Status: ACTIVE | Noted: 2022-02-15

## 2022-02-22 DIAGNOSIS — C50.912 MALIGNANT NEOPLASM OF LEFT FEMALE BREAST, UNSPECIFIED ESTROGEN RECEPTOR STATUS, UNSPECIFIED SITE OF BREAST (HCC): Primary | ICD-10-CM

## 2022-02-23 ENCOUNTER — HOSPITAL ENCOUNTER (OUTPATIENT)
Dept: MAMMOGRAPHY | Age: 82
Discharge: HOME | End: 2022-02-23
Attending: SURGERY
Payer: MEDICARE

## 2022-02-23 ENCOUNTER — NURSE NAVIGATOR (OUTPATIENT)
Dept: CASE MANAGEMENT | Age: 82
End: 2022-02-23

## 2022-02-23 DIAGNOSIS — N63.25 BREAST LUMP ON LEFT SIDE AT 12 O'CLOCK POSITION: ICD-10-CM

## 2022-02-23 DIAGNOSIS — N63.20 LEFT BREAST MASS: ICD-10-CM

## 2022-02-23 PROCEDURE — 77065 DX MAMMO INCL CAD UNI: CPT

## 2022-02-23 PROCEDURE — 76642 ULTRASOUND BREAST LIMITED: CPT

## 2022-02-23 NOTE — PROGRESS NOTES
[facility]  Breast Navigator Encounter    Name:    Leonard Armenta  Age:    [de-identified] y.o. Diagnosis:    LEFT breast cancer    Interdisciplinary Team:  Med-Onc:    Surg-Onc:    Dr. Kira Betancur:    Plastics:    :     Nurse Navigator:  Vinicius Mcginnis RN, BSN, [facility]      Encounter type:  []Patient Initiated  []Navigator Follow-up []Pre-op  []Post-op  []Check-in Prior to First Treatment []Treatment Modality Change  []Survivorship Transition [x]Other:   Initial Navigator Encounter     Narrative:    Reached out to patient today for initial navigator encounter. Was contacted by Bert Estrella at [facility] regarding this patient. Saw Dr. Tanja Cid for a breast lump. Had a biopsy which proved to be cancer. The patient lives very close to [facility] and does not want to go to [facility] at all for any treatment. Inocencia arranged for the patient to see Dr. Viktoria Wang next week. Dr. Tanja Cdi had explained that she needed a lumpectomy and discussed her pathology report with her, so really did not have any questions for me today. I told her I thought Dr. Viktoria Wang may discuss MRI with her, and she thinks that she can probably do this, however just had imaging today at [facility] (results in chart). I suggested that she discuss this with Dr. Viktoria Wang at the time of her appointment next week to see if this needs to be part of her work-up. She wants to make sure that if there is something that she does not understand that I will talk to her , and I told her I had no problem doing this. I told her that I hoped to meet her next week at the time of her appointment with Dr. Viktoria Wang. Provided the patient with my contact information and discussed my role in her care. Will continue to follow patient throughout  the care continuum.               Vinicius Mcginnis RN, BSN, [facility]  Oncology Breast Navigator     [address]  [address] [facility], [address] [facility] [address]  W: [phone]  F: [phone]  [email or web address]  Good Help to Those in Need®

## 2022-02-28 ENCOUNTER — NURSE NAVIGATOR (OUTPATIENT)
Dept: CASE MANAGEMENT | Age: 82
End: 2022-02-28

## 2022-02-28 ENCOUNTER — OFFICE VISIT (OUTPATIENT)
Dept: SURGERY | Age: 82
End: 2022-02-28
Payer: MEDICARE

## 2022-02-28 VITALS
WEIGHT: 118 LBS | HEIGHT: 64 IN | HEART RATE: 107 BPM | BODY MASS INDEX: 20.14 KG/M2 | DIASTOLIC BLOOD PRESSURE: 99 MMHG | SYSTOLIC BLOOD PRESSURE: 179 MMHG

## 2022-02-28 DIAGNOSIS — C50.912 RECURRENT MALIGNANT NEOPLASM OF LEFT BREAST (HCC): Primary | ICD-10-CM

## 2022-02-28 PROCEDURE — 99215 OFFICE O/P EST HI 40 MIN: CPT | Performed by: SURGERY

## 2022-02-28 PROCEDURE — 76642 ULTRASOUND BREAST LIMITED: CPT | Performed by: SURGERY

## 2022-02-28 NOTE — PATIENT INSTRUCTIONS
Breast Cancer: Care Instructions  Your Care Instructions     Breast cancer occurs when abnormal cells grow out of control in the breast. These cancer cells can spread within the breast, to nearby lymph nodes and other tissues, and to other parts of the body. Being treated for cancer can weaken your body, and you may feel very tired. Get the rest your body needs so you can feel better. Finding out that you have cancer is scary. You may feel many emotions and may need some help coping. Seek out family, friends, and counselors for support. You also can do things at home to make yourself feel better while you go through treatment. Call the [employer] (7-111.358.3445) or visit its website at [employer] for more information. Follow-up care is a key part of your treatment and safety. Be sure to make and go to all appointments, and call your doctor if you are having problems. It's also a good idea to know your test results and keep a list of the medicines you take. How can you care for yourself at home? · Take your medicines exactly as prescribed. Call your doctor if you think you are having a problem with your medicine. You may get medicine for nausea and vomiting if you have these side effects. · Follow your doctor's instructions to relieve pain. Pain from cancer and surgery can almost always be controlled. Use pain medicine when you first notice pain, before it becomes severe. · Eat healthy food. If you do not feel like eating, try to eat food that has protein and extra calories to keep up your strength and prevent weight loss. Drink liquid meal replacements for extra calories and protein. Try to eat your main meal early. · Get some physical activity every day, but do not get too tired. Keep doing the hobbies you enjoy as your energy allows. · Do not smoke. Smoking can make your cancer worse. If you need help quitting, talk to your doctor about stop-smoking programs and medicines.  These can increase your chances of quitting for good. · Take steps to control your stress and workload. Learn relaxation techniques. ? Share your feelings. Stress and tension affect our emotions. By expressing your feelings to others, you may be able to understand and cope with them. ? Consider joining a support group. Talking about a problem with your spouse, a good friend, or other people with similar problems is a good way to reduce tension and stress. ? Express yourself through art. Try writing, crafts, dance, or art to relieve stress. Some dance, writing, or art groups may be available just for people who have cancer. ? Be kind to your body and mind. Getting enough sleep, eating a healthy diet, and taking time to do things you enjoy can contribute to an overall feeling of balance in your life and can help reduce stress. ? Get help if you need it. Discuss your concerns with your doctor or counselor. · If you are vomiting or have diarrhea:  ? Drink plenty of fluids to prevent dehydration. Choose water and other clear liquids. If you have kidney, heart, or liver disease and have to limit fluids, talk with your doctor before you increase the amount of fluids you drink. ? When you are able to eat, try clear soups, mild foods, and liquids until all symptoms are gone for 12 to 48 hours. Other good choices include dry toast, crackers, cooked cereal, and gelatin dessert, such as Jell-O.  · If you have not already done so, prepare a list of advance directives. Advance directives are instructions to your doctor and family members about what kind of care you want if you become unable to speak or express yourself. When should you call for help? Call 911 anytime you think you may need emergency care. For example, call if:    · You passed out (lost consciousness).    Call your doctor now or seek immediate medical care if:    · You have a fever.     · You have abnormal bleeding.     · You think you have an infection.     · You have new or worse pain.     · You have new symptoms, such as a cough, belly pain, vomiting, diarrhea, or a rash. Watch closely for changes in your health, and be sure to contact your doctor if:    · You are much more tired than usual.     · You have swollen glands in your armpits, groin, or neck.     · You do not get better as expected. Where can you learn more? Go to http://www.alvarez.com/  Enter V321 in the search box to learn more about \"Breast Cancer: Care Instructions. \"  Current as of: December 17, 2020               Content Version: 13.0  © 8296-6844 [employer]. Care instructions adapted under license by [employer] (which disclaims liability or warranty for this information). If you have questions about a medical condition or this instruction, always ask your healthcare professional. [address] any warranty or liability for your use of this information. Breast Cancer: Care Instructions  Your Care Instructions     Breast cancer occurs when abnormal cells grow out of control in the breast. These cancer cells can spread within the breast, to nearby lymph nodes and other tissues, and to other parts of the body. Being treated for cancer can weaken your body, and you may feel very tired. Get the rest your body needs so you can feel better. Finding out that you have cancer is scary. You may feel many emotions and may need some help coping. Seek out family, friends, and counselors for support. You also can do things at home to make yourself feel better while you go through treatment. Call the [employer]. (8-195.687.6995) or visit its website at [address] [employer] for more information. Follow-up care is a key part of your treatment and safety. Be sure to make and go to all appointments, and call your doctor if you are having problems.  It's also a good idea to know your test results and keep a list of the medicines you take.  How can you care for yourself at home? · Take your medicines exactly as prescribed. Call your doctor if you think you are having a problem with your medicine. You may get medicine for nausea and vomiting if you have these side effects. · Follow your doctor's instructions to relieve pain. Pain from cancer and surgery can almost always be controlled. Use pain medicine when you first notice pain, before it becomes severe. · Eat healthy food. If you do not feel like eating, try to eat food that has protein and extra calories to keep up your strength and prevent weight loss. Drink liquid meal replacements for extra calories and protein. Try to eat your main meal early. · Get some physical activity every day, but do not get too tired. Keep doing the hobbies you enjoy as your energy allows. · Do not smoke. Smoking can make your cancer worse. If you need help quitting, talk to your doctor about stop-smoking programs and medicines. These can increase your chances of quitting for good. · Take steps to control your stress and workload. Learn relaxation techniques. ? Share your feelings. Stress and tension affect our emotions. By expressing your feelings to others, you may be able to understand and cope with them. ? Consider joining a support group. Talking about a problem with your spouse, a good friend, or other people with similar problems is a good way to reduce tension and stress. ? Express yourself through art. Try writing, crafts, dance, or art to relieve stress. Some dance, writing, or art groups may be available just for people who have cancer. ? Be kind to your body and mind. Getting enough sleep, eating a healthy diet, and taking time to do things you enjoy can contribute to an overall feeling of balance in your life and can help reduce stress. ? Get help if you need it. Discuss your concerns with your doctor or counselor.   · If you are vomiting or have diarrhea:  ? Drink plenty of fluids to prevent dehydration. Choose water and other clear liquids. If you have kidney, heart, or liver disease and have to limit fluids, talk with your doctor before you increase the amount of fluids you drink. ? When you are able to eat, try clear soups, mild foods, and liquids until all symptoms are gone for 12 to 48 hours. Other good choices include dry toast, crackers, cooked cereal, and gelatin dessert, such as Jell-O.  · If you have not already done so, prepare a list of advance directives. Advance directives are instructions to your doctor and family members about what kind of care you want if you become unable to speak or express yourself. When should you call for help? Call 911 anytime you think you may need emergency care. For example, call if:    · You passed out (lost consciousness). Call your doctor now or seek immediate medical care if:    · You have a fever.     · You have abnormal bleeding.     · You think you have an infection.     · You have new or worse pain.     · You have new symptoms, such as a cough, belly pain, vomiting, diarrhea, or a rash. Watch closely for changes in your health, and be sure to contact your doctor if:    · You are much more tired than usual.     · You have swollen glands in your armpits, groin, or neck.     · You do not get better as expected. Where can you learn more? Go to http://www.[employer].com/  Enter V321 in the search box to learn more about \"Breast Cancer: Care Instructions. \"  Current as of: December 17, 2020               Content Version: 13.0  © 1090-1111 [employer]. Care instructions adapted under license by [employer] (which disclaims liability or warranty for this information). If you have questions about a medical condition or this instruction, always ask your healthcare professional. [address] any warranty or liability for your use of this information.

## 2022-02-28 NOTE — PROGRESS NOTES
HISTORY OF PRESENT ILLNESS  Jes Church is a [de-identified] y.o. female. HPI  NEW patient consult referred by Dr. Christa Perkins for recurrent LEFT breast cancer. Patient felt a little BB size lump to her LEFT breast.  She is here with her . 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 ID 97%, Her 2 2+, Ki 10% FISH pending    Family History:  Father had Colon cancer     ANDREAS Results (most recent):  Results from [place] encounter on 02/23/22    ANDREAS 3D NISH W MAMMO LT DX INCL CAD    Narrative  EXAM:  ANDREAS 3D NISH W MAMMO LT DX INCL CAD, US BREAST LT LIMITED=<3 QUAD    INDICATION: Left breast lump status post biopsy on 2/15/2022 with pathology of  mucinous carcinoma. History of left breast cancer in 1997. COMPARISON: 8/26/2021 and prior studies dating back to 2005    TECHNIQUE: Diagnostic left digital MLO and CC and ML views. 3-D/tomosynthesis  views. Computer aided detection (CAD) was utilized. Left breast ultrasound. BREAST COMPOSITION: The breast tissue is heterogeneously dense, which could  obscure detection of small masses (approximately 51-75% glandular). FINDINGS:  Mammography:  Prior lumpectomy changes in the upper outer posterior breast are stable. There  is a new biopsy clip slightly posterior and medial to a developing focal  asymmetry in the subareolar left breast. There are no suspicious pleomorphic  calcifications. Ultrasound:  Left breast subareolar ultrasound at the 12:00 position correlating with the  palpable abnormality demonstrates an area of heterogeneity with central  anechogenicity likely representing postbiopsy changes. No distinct mass is  identified. Impression  Presumed postbiopsy changes in the subareolar left breast with no  distinct mass demonstrated. Recommendation:  Clinical management of known left breast malignancy.     BI-RADS Assessment Category 6: Known biopsy proven malignancy- Appropriate  action should be taken.        The findings and recommendations were discussed with the patient at the time of  the exam.      Past Medical History:   Diagnosis Date    Arthritis     back, neck, hands    Breast cancer ([address]     Left    Breast cancer [facility] [address] 02/15/2022    Left Mucinous Carcinoma    Chronic pain     fibromyalgia - neck/hands    Fibromyalgia     History of breast cancer     Left breast cancer treated with surgery and radiation.  Hyperlipemia     Hypertension     Other ill-defined conditions(799.89)     IBD - no problem with this at this time    S/P radiation therapy     Vaginal delivery     Four times. Four episiotomies. Past Surgical History:   Procedure Laterality Date    COLONOSCOPY N/A 6/15/2016    COL[facility]COPY performed [facility]a Yan [facility] at [facility] ENDOSCOPY    HX APPENDECTOMY      HX BREAST BIOPSY Left     stereo  benign    HX BREAST BIOPSY Left 02/15/2022    US guided Mucinous Carcinoma    HX BREAST LUMPECTOMY Left     Ca.  HX GI      colonoscopies - last     HX GYN  1989    hysterectomy    HX HEENT      childhood tonsillectomy    HX OTHER SURGICAL      Hemorrhoidectomy performed in a doctor's office [address].  DC BREAST SURGERY PROCEDURE UNLISTED      L lumpectomy x2    VASCULAR SURGERY PROCEDURE UNLIST      vein sgy on L leg     Social History     Socioeconomic History    Marital status:      Spouse name: Not on file    Number of children: Not on file    Years of education: Not on file    Highest education level: Not on file   Occupational History    Not on file   Tobacco Use    Smoking status: Former Smoker     Packs/day: 0.25     Quit date: 11/3/1984     Years since quittin.3    Smokeless tobacco: Never Used   Substance and Sexual Activity    Alcohol use:  Yes     Alcohol/week: 2.5 standard drinks     Types: 3 Standard drinks or equivalent per week     Comment: occasional    Drug use: No    Sexual activity: Not on file Other Topics Concern    Not on file   Social History Narrative    Not on file     Social Determinants of Health     Financial Resource Strain:     Difficulty of Paying Living Expenses: Not on file   Food Insecurity:     Worried About Running Out of Food in the Last Year: Not on file    Boris of Food in the Last Year: Not on file   Transportation Needs:     Lack of Transportation (Medical): Not on file    Lack of Transportation (Non-Medical): Not on file   Physical Activity:     Days of Exercise per Week: Not on file    Minutes of Exercise per Session: Not on file   Stress:     Feeling of Stress : Not on file   Social Connections:     Frequency of Communication with Friends and Family: Not on file    Frequency of Social Gatherings with Friends and Family: Not on file    Attends Restorationism Services: Not on file    Active Member of [address] [place] [address] or Organizations: Not on file    Attends Club or Organization Meetings: Not on file    Marital Status: Not on file   Intimate Partner Violence:     Fear of Current or Ex-Partner: Not on file    Emotionally Abused: Not on file    Physically Abused: Not on file    Sexually Abused: Not on file   Housing Stability:     Unable to Pay for Housing in the Last Year: Not on file    Number of [place] in the Last Year: Not on file    Unstable Housing in the Last Year: Not on file         Current Outpatient Medications:     MULTIVITAMIN PO, Take  by mouth. Takes one po once daily. , Disp: , Rfl:     atorvastatin (LIPITOR) 10 mg tablet, Take 10 mg by mouth daily. Indications: HYPERCHOLESTEROLEMIA, Disp: , Rfl:     oxyCODONE IR (ROXICODONE) 5 mg immediate release tablet, Take 1-2 Tabs by mouth every four (4) hours as needed for Pain. Max Daily Amount: 60 mg. (Patient not taking: Reported on 2/15/2022), Disp: 50 Tab, Rfl: 0    magnesium 250 mg tab, Take  by mouth daily.  (Patient not taking: Reported on 2/15/2022), Disp: , Rfl:     GLUC/CHND/OM3/DHA/EPA/FISH/STR (GLUCOSAMINE CHONDROITIN PLUS PO), Take  by mouth daily. (Patient not taking: Reported on 2/15/2022), Disp: , Rfl:     folic acid 649 mcg tablet, Take 400 mcg by mouth daily. (Patient not taking: Reported on 2/28/2022), Disp: , Rfl:     Potassium Gluconate 595 mg (99 mg) tablet, Take 595 mg by mouth daily. (Patient not taking: Reported on 2/15/2022), Disp: , Rfl:     spironolactone (ALDACTONE) 50 mg tablet, Take 50 mg by mouth daily. (Patient not taking: Reported on 2/15/2022), Disp: , Rfl:     LACTOBACILLUS ACIDOPHILUS (PROBIOTIC PO), Take  by mouth. Takes one po once daily. (Patient not taking: Reported on 2/15/2022), Disp: , Rfl:     metoprolol (LOPRESSOR) 50 mg tablet, Take 50 mg by mouth daily. Indications: HYPERTENSION (Patient not taking: Reported on 2/15/2022), Disp: , Rfl:   Allergies   Allergen Reactions    Augmentin [Amoxicillin-Pot Clavulanate] Diarrhea    Indocin [Indomethacin Sodium] Nausea and Vomiting    Tolectin 600 Swelling     Review of Systems   All other systems reviewed and are negative. Physical Exam  Vitals and nursing note reviewed. Chest:   Breasts: Breasts are symmetrical.      Right: No inverted nipple, mass, nipple discharge, skin change, tenderness, axillary adenopathy or supraclavicular adenopathy. Left: Mass present. No inverted nipple, nipple discharge, skin change, tenderness, axillary adenopathy or supraclavicular adenopathy. Lymphadenopathy:      Cervical: No cervical adenopathy. Upper Body:      Right upper body: No supraclavicular, axillary or pectoral adenopathy. Left upper body: No supraclavicular, axillary or pectoral adenopathy. BREAST ULTRASOUND, Preop planning  Indication:preop planning  left Breast nipple   Technique:   The area was scanned using a high-frequency linear-array near-field transducer  Findings:  irregular mass with dropout  Impression: Biopsy site visible with ultrasound  Disposition:  Will schedule mastectomy, sln biopsy     ASSESSMENT and PLAN    ICD-10-CM ICD-9-CM    1. Recurrent malignant neoplasm of left breast (HCC)  C50.912 174.9      [de-identified] yo female with left breast mucinous grade 1 Er+ Pr + Her 2 eq   This is a recurrence after BCT for dcis in 1996  She is here with her   I have reviewed the imaging and pathology with her and she was given copies of these reports. 90 minutes were spent face-to-face with the patient during this encounter and 90% of that time was spent on counseling and coordination of care. 1. Discussed lumpectomy and radiation vs mastectomy. Discussed reconstruction. 2. Discussed sentinel lymph node biopsy. 3. Discussed external beam radiation. 4. Discussed hormone therapy. 5. Discussed the possibility of chemotherapy. Patient has been thinking about surgical options  Wants left mastectomy, sln biopsy   Will schedule.

## 2022-02-28 NOTE — H&P (VIEW-ONLY)
HISTORY OF PRESENT ILLNESS  Ishmael Segal is a [de-identified] y.o. female. HPI  NEW patient consult referred by Dr. Sachin Ortiz for recurrent LEFT breast cancer. Patient felt a little BB size lump to her LEFT breast.  She is here with her . 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 KS 97%, Her 2 2+, Ki 10% FISH pending    Family History:  Father had Colon cancer     ANDREAS Results (most recent):  Results from [place] encounter on 02/23/22    ANDREAS 3D NISH W MAMMO LT DX INCL CAD    Narrative  EXAM:  ANDREAS 3D NISH W MAMMO LT DX INCL CAD, US BREAST LT LIMITED=<3 QUAD    INDICATION: Left breast lump status post biopsy on 2/15/2022 with pathology of  mucinous carcinoma. History of left breast cancer in 1997. COMPARISON: 8/26/2021 and prior studies dating back to 2005    TECHNIQUE: Diagnostic left digital MLO and CC and ML views. 3-D/tomosynthesis  views. Computer aided detection (CAD) was utilized. Left breast ultrasound. BREAST COMPOSITION: The breast tissue is heterogeneously dense, which could  obscure detection of small masses (approximately 51-75% glandular). FINDINGS:  Mammography:  Prior lumpectomy changes in the upper outer posterior breast are stable. There  is a new biopsy clip slightly posterior and medial to a developing focal  asymmetry in the subareolar left breast. There are no suspicious pleomorphic  calcifications. Ultrasound:  Left breast subareolar ultrasound at the 12:00 position correlating with the  palpable abnormality demonstrates an area of heterogeneity with central  anechogenicity likely representing postbiopsy changes. No distinct mass is  identified. Impression  Presumed postbiopsy changes in the subareolar left breast with no  distinct mass demonstrated. Recommendation:  Clinical management of known left breast malignancy.     BI-RADS Assessment Category 6: Known biopsy proven malignancy- Appropriate  action should be taken.        The findings and recommendations were discussed with the patient at the time of  the exam.      Past Medical History:   Diagnosis Date    Arthritis     back, neck, hands    Breast cancer ([address]     Left    Breast cancer ([facility] [address] [facility] 02/15/2022    Left Mucinous Carcinoma    Chronic pain     fibromyalgia - neck/hands    Fibromyalgia     History of breast cancer     Left breast cancer treated with surgery and radiation.  Hyperlipemia     Hypertension     Other ill-defined conditions(799.89)     IBD - no problem with this at this time    S/P radiation therapy     Vaginal delivery     Four times. Four episiotomies. Past Surgical History:   Procedure Laterality Date    COLONOSCOPY N/A 6/15/2016    COL[facility]    HX APPENDECTOMY      HX BREAST BIOPSY Left     stereo  benign    HX BREAST BIOPSY Left 02/15/2022    US guided Mucinous Carcinoma    HX BREAST LUMPECTOMY Left     Ca.  HX GI      colonoscopies - last     HX GYN      hysterectomy    HX HEENT      childhood tonsillectomy    HX OTHER SURGICAL      Hemorrhoidectomy performed in a doctor's office [address].  VT BREAST SURGERY PROCEDURE UNLISTED      L lumpectomy x2    VASCULAR SURGERY PROCEDURE UNLIST      vein sgy on L leg     Social History     Socioeconomic History    Marital status:      Spouse name: Not on file    Number of children: Not on file    Years of education: Not on file    Highest education level: Not on file   Occupational History    Not on file   Tobacco Use    Smoking status: Former Smoker     Packs/day: 0.25     Quit date: 11/3/1984     Years since quittin.3    Smokeless tobacco: Never Used   Substance and Sexual Activity    Alcohol use:  Yes     Alcohol/week: 2.5 standard drinks     Types: 3 Standard drinks or equivalent per week     Comment: occasional    Drug use: No    Sexual activity: Not on file Other Topics Concern    Not on file   Social History Narrative    Not on file     Social Determinants of Health     Financial Resource Strain:     Difficulty of Paying Living Expenses: Not on file   Food Insecurity:     Worried About Running Out of Food in the Last Year: Not on file    Boris of Food in the Last Year: Not on file   Transportation Needs:     Lack of Transportation (Medical): Not on file    Lack of Transportation (Non-Medical): Not on file   Physical Activity:     Days of Exercise per Week: Not on file    Minutes of Exercise per Session: Not on file   Stress:     Feeling of Stress : Not on file   Social Connections:     Frequency of Communication with Friends and Family: Not on file    Frequency of Social Gatherings with Friends and Family: Not on file    Attends Evangelical Services: Not on file    Active Member of [address] [place] [address] or Organizations: Not on file    Attends Club or Organization Meetings: Not on file    Marital Status: Not on file   Intimate Partner Violence:     Fear of Current or Ex-Partner: Not on file    Emotionally Abused: Not on file    Physically Abused: Not on file    Sexually Abused: Not on file   Housing Stability:     Unable to Pay for Housing in the Last Year: Not on file    Number of [place] in the Last Year: Not on file    Unstable Housing in the Last Year: Not on file         Current Outpatient Medications:     MULTIVITAMIN PO, Take  by mouth. Takes one po once daily. , Disp: , Rfl:     atorvastatin (LIPITOR) 10 mg tablet, Take 10 mg by mouth daily. Indications: HYPERCHOLESTEROLEMIA, Disp: , Rfl:     oxyCODONE IR (ROXICODONE) 5 mg immediate release tablet, Take 1-2 Tabs by mouth every four (4) hours as needed for Pain. Max Daily Amount: 60 mg. (Patient not taking: Reported on 2/15/2022), Disp: 50 Tab, Rfl: 0    magnesium 250 mg tab, Take  by mouth daily.  (Patient not taking: Reported on 2/15/2022), Disp: , Rfl:     GLUC/CHND/OM3/DHA/EPA/FISH/STR (GLUCOSAMINE CHONDROITIN PLUS PO), Take  by mouth daily. (Patient not taking: Reported on 2/15/2022), Disp: , Rfl:     folic acid 308 mcg tablet, Take 400 mcg by mouth daily. (Patient not taking: Reported on 2/28/2022), Disp: , Rfl:     Potassium Gluconate 595 mg (99 mg) tablet, Take 595 mg by mouth daily. (Patient not taking: Reported on 2/15/2022), Disp: , Rfl:     spironolactone (ALDACTONE) 50 mg tablet, Take 50 mg by mouth daily. (Patient not taking: Reported on 2/15/2022), Disp: , Rfl:     LACTOBACILLUS ACIDOPHILUS (PROBIOTIC PO), Take  by mouth. Takes one po once daily. (Patient not taking: Reported on 2/15/2022), Disp: , Rfl:     metoprolol (LOPRESSOR) 50 mg tablet, Take 50 mg by mouth daily. Indications: HYPERTENSION (Patient not taking: Reported on 2/15/2022), Disp: , Rfl:   Allergies   Allergen Reactions    Augmentin [Amoxicillin-Pot Clavulanate] Diarrhea    Indocin [Indomethacin Sodium] Nausea and Vomiting    Tolectin 600 Swelling     Review of Systems   All other systems reviewed and are negative. Physical Exam  Vitals and nursing note reviewed. Chest:   Breasts: Breasts are symmetrical.      Right: No inverted nipple, mass, nipple discharge, skin change, tenderness, axillary adenopathy or supraclavicular adenopathy. Left: Mass present. No inverted nipple, nipple discharge, skin change, tenderness, axillary adenopathy or supraclavicular adenopathy. Lymphadenopathy:      Cervical: No cervical adenopathy. Upper Body:      Right upper body: No supraclavicular, axillary or pectoral adenopathy. Left upper body: No supraclavicular, axillary or pectoral adenopathy. BREAST ULTRASOUND, Preop planning  Indication:preop planning  left Breast nipple   Technique:   The area was scanned using a high-frequency linear-array near-field transducer  Findings:  irregular mass with dropout  Impression: Biopsy site visible with ultrasound  Disposition:  Will schedule mastectomy, sln biopsy     ASSESSMENT and PLAN    ICD-10-CM ICD-9-CM    1. Recurrent malignant neoplasm of left breast (HCC)  C50.912 174.9      [de-identified] yo female with left breast mucinous grade 1 Er+ Pr + Her 2 eq   This is a recurrence after BCT for dcis in 1996  She is here with her   I have reviewed the imaging and pathology with her and she was given copies of these reports. 90 minutes were spent face-to-face with the patient during this encounter and 90% of that time was spent on counseling and coordination of care. 1. Discussed lumpectomy and radiation vs mastectomy. Discussed reconstruction. 2. Discussed sentinel lymph node biopsy. 3. Discussed external beam radiation. 4. Discussed hormone therapy. 5. Discussed the possibility of chemotherapy. Patient has been thinking about surgical options  Wants left mastectomy, sln biopsy   Will schedule.

## 2022-03-02 NOTE — PROGRESS NOTES
[address]  Breast Navigator Encounter    Name:    Bal Jones  Age:    [de-identified] y.o. Diagnosis:    LEFT breast cancer    Interdisciplinary Team:  Med-Onc:    Surg-Onc:    Dr. Vera:    Plastics:    :     Nurse Navigator:  Ken Camargo, RN, BSN, [facility]      Encounter type:  []Patient Initiated  [x]Navigator Follow-up []Pre-op  []Post-op  []Check-in Prior to First Treatment []Treatment Modality Change  []Survivorship Transition []Other:       Narrative:    Met patient and  at the time of her initial visit with Dr. Mimi Tompkins. She felt like all of her questions were answered today by Dr. Mimi Tompkins. She had no questions for me. Talked briefly about activity S/P mastectomy. She has my contact information and discussed my role in her care. Will continue to follow patient throughout  the care continuum.               Ken Camargo RN, BSN, [facility]  Oncology Breast Navigator     [address]  [address] [place] [address] [facility]  W: [phone]  F: [phone]  [email or web address] Images  Good Help to Those in [facility]

## 2022-03-07 ENCOUNTER — HOSPITAL ENCOUNTER (OUTPATIENT)
Dept: PREADMISSION TESTING | Age: 82
Discharge: HOME | End: 2022-03-07
Attending: INTERNAL MEDICINE
Payer: MEDICARE

## 2022-03-07 ENCOUNTER — TRANSCRIBE ORDER (OUTPATIENT)
Dept: REGISTRATION | Age: 82
End: 2022-03-07

## 2022-03-07 DIAGNOSIS — U07.1 COVID-19: Primary | ICD-10-CM

## 2022-03-07 DIAGNOSIS — U07.1 COVID-19: ICD-10-CM

## 2022-03-07 LAB
SARS-COV-2, XPLCVT: NOT DETECTED
SOURCE, COVRS: NORMAL

## 2022-03-07 PROCEDURE — U0005 INFEC AGEN DETEC AMPLI PROBE: HCPCS

## 2022-03-08 ENCOUNTER — ANESTHESIA EVENT (OUTPATIENT)
Dept: ENDOSCOPY | Age: 82
End: 2022-03-08
Payer: MEDICARE

## 2022-03-08 ENCOUNTER — HOSPITAL ENCOUNTER (OUTPATIENT)
Age: 82
Setting detail: OUTPATIENT SURGERY
Discharge: HOME | End: 2022-03-08
Attending: INTERNAL MEDICINE | Admitting: INTERNAL MEDICINE
Payer: MEDICARE

## 2022-03-08 ENCOUNTER — ANESTHESIA (OUTPATIENT)
Dept: ENDOSCOPY | Age: 82
End: 2022-03-08
Payer: MEDICARE

## 2022-03-08 VITALS
DIASTOLIC BLOOD PRESSURE: 77 MMHG | RESPIRATION RATE: 18 BRPM | HEART RATE: 85 BPM | TEMPERATURE: 97.8 F | OXYGEN SATURATION: 98 % | WEIGHT: 114 LBS | BODY MASS INDEX: 19.46 KG/M2 | SYSTOLIC BLOOD PRESSURE: 126 MMHG | HEIGHT: 64 IN

## 2022-03-08 PROCEDURE — 76040000007: Performed by: INTERNAL MEDICINE

## 2022-03-08 PROCEDURE — 74011250636 HC RX REV CODE- 250/636: Performed by: NURSE ANESTHETIST, CERTIFIED REGISTERED

## 2022-03-08 PROCEDURE — 74011250636 HC RX REV CODE- 250/636: Performed by: INTERNAL MEDICINE

## 2022-03-08 PROCEDURE — 74011000250 HC RX REV CODE- 250: Performed by: NURSE ANESTHETIST, CERTIFIED REGISTERED

## 2022-03-08 PROCEDURE — 76060000032 HC ANESTHESIA 0.5 TO 1 HR: Performed by: INTERNAL MEDICINE

## 2022-03-08 RX ORDER — GLUCOSAMINE SULFATE 1500 MG
POWDER IN PACKET (EA) ORAL DAILY
COMMUNITY

## 2022-03-08 RX ORDER — SODIUM CHLORIDE 9 MG/ML
INJECTION, SOLUTION INTRAVENOUS
Status: DISCONTINUED | OUTPATIENT
Start: 2022-03-08 | End: 2022-03-08 | Stop reason: HOSPADM

## 2022-03-08 RX ORDER — LIDOCAINE HYDROCHLORIDE 20 MG/ML
INJECTION, SOLUTION EPIDURAL; INFILTRATION; INTRACAUDAL; PERINEURAL AS NEEDED
Status: DISCONTINUED | OUTPATIENT
Start: 2022-03-08 | End: 2022-03-08 | Stop reason: HOSPADM

## 2022-03-08 RX ORDER — DEXTROMETHORPHAN/PSEUDOEPHED 2.5-7.5/.8
1.2 DROPS ORAL
Status: DISCONTINUED | OUTPATIENT
Start: 2022-03-08 | End: 2022-03-08 | Stop reason: HOSPADM

## 2022-03-08 RX ORDER — SODIUM CHLORIDE 9 MG/ML
50 INJECTION, SOLUTION INTRAVENOUS CONTINUOUS
Status: DISCONTINUED | OUTPATIENT
Start: 2022-03-08 | End: 2022-03-08 | Stop reason: HOSPADM

## 2022-03-08 RX ORDER — SODIUM CHLORIDE 0.9 % (FLUSH) 0.9 %
5-40 SYRINGE (ML) INJECTION EVERY 8 HOURS
Status: DISCONTINUED | OUTPATIENT
Start: 2022-03-08 | End: 2022-03-08 | Stop reason: HOSPADM

## 2022-03-08 RX ORDER — PROPOFOL 10 MG/ML
INJECTION, EMULSION INTRAVENOUS AS NEEDED
Status: DISCONTINUED | OUTPATIENT
Start: 2022-03-08 | End: 2022-03-08 | Stop reason: HOSPADM

## 2022-03-08 RX ORDER — NALOXONE HYDROCHLORIDE 0.4 MG/ML
0.4 INJECTION, SOLUTION INTRAMUSCULAR; INTRAVENOUS; SUBCUTANEOUS
Status: DISCONTINUED | OUTPATIENT
Start: 2022-03-08 | End: 2022-03-08 | Stop reason: HOSPADM

## 2022-03-08 RX ORDER — FLUMAZENIL 0.1 MG/ML
0.2 INJECTION INTRAVENOUS
Status: DISCONTINUED | OUTPATIENT
Start: 2022-03-08 | End: 2022-03-08 | Stop reason: HOSPADM

## 2022-03-08 RX ORDER — ATROPINE SULFATE 0.1 MG/ML
0.5 INJECTION INTRAVENOUS
Status: DISCONTINUED | OUTPATIENT
Start: 2022-03-08 | End: 2022-03-08 | Stop reason: HOSPADM

## 2022-03-08 RX ORDER — SODIUM CHLORIDE 0.9 % (FLUSH) 0.9 %
5-40 SYRINGE (ML) INJECTION AS NEEDED
Status: DISCONTINUED | OUTPATIENT
Start: 2022-03-08 | End: 2022-03-08 | Stop reason: HOSPADM

## 2022-03-08 RX ORDER — EPINEPHRINE 0.1 MG/ML
1 INJECTION INTRACARDIAC; INTRAVENOUS
Status: DISCONTINUED | OUTPATIENT
Start: 2022-03-08 | End: 2022-03-08 | Stop reason: HOSPADM

## 2022-03-08 RX ORDER — GLUCOSAMINE/CHONDR SU A SOD 750-600 MG
TABLET ORAL
COMMUNITY

## 2022-03-08 RX ADMIN — PROPOFOL 80 MG: 10 INJECTION, EMULSION INTRAVENOUS at 12:43

## 2022-03-08 RX ADMIN — PROPOFOL 20 MG: 10 INJECTION, EMULSION INTRAVENOUS at 12:47

## 2022-03-08 RX ADMIN — PROPOFOL 20 MG: 10 INJECTION, EMULSION INTRAVENOUS at 13:00

## 2022-03-08 RX ADMIN — PROPOFOL 10 MG: 10 INJECTION, EMULSION INTRAVENOUS at 12:51

## 2022-03-08 RX ADMIN — PROPOFOL 20 MG: 10 INJECTION, EMULSION INTRAVENOUS at 13:13

## 2022-03-08 RX ADMIN — PROPOFOL 20 MG: 10 INJECTION, EMULSION INTRAVENOUS at 13:08

## 2022-03-08 RX ADMIN — PROPOFOL 10 MG: 10 INJECTION, EMULSION INTRAVENOUS at 12:57

## 2022-03-08 RX ADMIN — PROPOFOL 10 MG: 10 INJECTION, EMULSION INTRAVENOUS at 12:56

## 2022-03-08 RX ADMIN — PROPOFOL 20 MG: 10 INJECTION, EMULSION INTRAVENOUS at 13:15

## 2022-03-08 RX ADMIN — PROPOFOL 10 MG: 10 INJECTION, EMULSION INTRAVENOUS at 12:55

## 2022-03-08 RX ADMIN — LIDOCAINE HYDROCHLORIDE 40 MG: 20 INJECTION, SOLUTION EPIDURAL; INFILTRATION; INTRACAUDAL; PERINEURAL at 12:43

## 2022-03-08 RX ADMIN — PROPOFOL 10 MG: 10 INJECTION, EMULSION INTRAVENOUS at 12:49

## 2022-03-08 RX ADMIN — SODIUM CHLORIDE: 900 INJECTION, SOLUTION INTRAVENOUS at 12:33

## 2022-03-08 RX ADMIN — PROPOFOL 20 MG: 10 INJECTION, EMULSION INTRAVENOUS at 13:04

## 2022-03-08 RX ADMIN — PROPOFOL 40 MG: 10 INJECTION, EMULSION INTRAVENOUS at 12:45

## 2022-03-08 RX ADMIN — PROPOFOL 20 MG: 10 INJECTION, EMULSION INTRAVENOUS at 13:01

## 2022-03-08 RX ADMIN — PROPOFOL 10 MG: 10 INJECTION, EMULSION INTRAVENOUS at 12:59

## 2022-03-08 RX ADMIN — PROPOFOL 20 MG: 10 INJECTION, EMULSION INTRAVENOUS at 13:05

## 2022-03-08 RX ADMIN — PROPOFOL 20 MG: 10 INJECTION, EMULSION INTRAVENOUS at 13:02

## 2022-03-08 RX ADMIN — PROPOFOL 10 MG: 10 INJECTION, EMULSION INTRAVENOUS at 12:53

## 2022-03-08 RX ADMIN — PROPOFOL 20 MG: 10 INJECTION, EMULSION INTRAVENOUS at 13:09

## 2022-03-08 RX ADMIN — PROPOFOL 10 MG: 10 INJECTION, EMULSION INTRAVENOUS at 12:58

## 2022-03-08 RX ADMIN — PROPOFOL 20 MG: 10 INJECTION, EMULSION INTRAVENOUS at 13:06

## 2022-03-08 NOTE — PERIOP NOTES

## 2022-03-08 NOTE — DISCHARGE INSTRUCTIONS
[address] [facility] [place] VALENZUELA. Nemo Wade MD  [phone]      March 8, 2022    Issa Garcia  YOB: 1940    COLONOSCOPY DISCHARGE INSTRUCTIONS    If there is redness at IV site you should apply warm compress to area. If redness or soreness persist contact Dr. Nemo Wade or your primary care doctor. There may be a slight amount of blood passed from the rectum. Gaseous discomfort may develop, but walking, belching will help relieve this. You may not operate a vehicle for 12 hours  You may not operate machinery or dangerous appliances for rest of today  You may not drink alcoholic beverages for 12 hours  Avoid making any critical decisions for 24 hours    DIET:  You may resume your normal diet, but some patients find that heavy or large meals may lead to indigestion or vomiting. I suggest a light meal as first food intake. MEDICATIONS:  The use of some over-the-counter pain medication may lead to bleeding after colon biopsies or polyp removal.  Tylenol (also called acetaminophen) is safe to take even if you have had colonoscopy with polyp removal.  Based on the procedure you had today you may safely take aspirin or aspirin-like products for the next ten (10) days. ACTIVITY:  You may resume your normal household activities, but it is recommended that you spend the remainder of the day resting -  avoid any strenuous activity. CALL DR. AYOUB'S OFFICE IF:  Increasing pain, nausea, vomiting  Abdominal distension (swelling)  Significant new or increased bleeding (oral or rectal)  Fever/Chills  Chest pain/shortness of breath                       Additional instructions:   - Resume normal medications. - Resume previous diet. - Based on clinical practice guidelines established by the US Preventive Services Task Force, no further surveillance colonoscopy is indicated. It was an honor to be your doctor today.   Please let me or my office staff know if you have any feedback about today's procedure. Rafael Ashby MD, March 8, 2022    Colonoscopy saves lives, and can prevent colon cancer. Everyone aged 48 or older needs colonoscopy.   Tell your family and friends: get the test!

## 2022-03-08 NOTE — PROCEDURES
[address] [facility].  [address] [facility] [address], [address]  [phone]                              Colonoscopy Procedure Note      Indications:    Screening colonoscopy     :  Kiarra Santiago MD    Staff: Endoscopy Yana Friendly: Salima Shetty  Endoscopy RN-1: Breezy Gonzalez    Referring Provider: Abel Person MD    Sedation: MAC    Procedure Details:  After informed consent was obtained with all risks and benefits of procedure explained and preoperative exam completed, the patient was taken to the endoscopy suite and placed in the left lateral decubitus position. Upon sequential sedation as per above, a digital rectal exam was performed per below. The Olympus videocolonoscope was inserted in the rectum and carefully advanced to the cecum, which was identified by the ileocecal valve and appendiceal orifice. The quality of preparation was excellent. [place] Bowel Prep Score : 3/3/3/9 . The colonoscope was slowly withdrawn with careful evaluation between folds. Retroflexion in the rectum was performed. Findings: No masses, no polyps, no abnormal mucosa  Rectum: normal   Sigmoid: normal   Descending Colon: normal  Transverse Colon: normal  Ascending Colon: normal  Cecum: normal       Interventions:  none    Specimen Removed:  * No specimens in log *    Complications: None. EBL:  none    Impression:    See Postoperative diagnosis above    Recommendations:     - Resume normal medications. - Resume previous diet. - Based on clinical practice guidelines established by the [place] Preventive Services Task Force, no further surveillance colonoscopy is indicated. Discharge Disposition:  Home in the company of a  when able to ambulate.     Kiarra Santiago MD  3/8/2022  1:22 PM

## 2022-03-08 NOTE — ANESTHESIA PREPROCEDURE EVALUATION
Relevant Problems   PERSONAL HX & FAMILY HX OF CANCER   (+) Breast cancer ([facility] [address]       Anesthetic History   No history of anesthetic complications            Review of Systems / Medical History  Patient summary reviewed, nursing notes reviewed and pertinent labs reviewed    Pulmonary  Within defined limits                 Neuro/Psych   Within defined limits           Cardiovascular  Within defined limits  Hypertension                   GI/Hepatic/Renal  Within defined limits              Endo/Other  Within defined limits      Arthritis     Other Findings              Physical Exam    Airway  Mallampati: II  TM Distance: > 6 cm  Neck ROM: normal range of motion   Mouth opening: Normal     Cardiovascular  Regular rate and rhythm,  S1 and S2 normal,  no murmur, click, rub, or gallop             Dental  No notable dental hx       Pulmonary  Breath sounds clear to auscultation               Abdominal  GI exam deferred       Other Findings            Anesthetic Plan    ASA: 2  Anesthesia type: MAC            Anesthetic plan and risks discussed with: Patient

## 2022-03-08 NOTE — ANESTHESIA POSTPROCEDURE EVALUATION
Procedure(s):  COLONOSCOPY   :-.    MAC    Anesthesia Post Evaluation      Multimodal analgesia: multimodal analgesia not used between 6 hours prior to anesthesia start to PACU discharge  Patient location during evaluation: PACU  Patient participation: complete - patient participated  Level of consciousness: awake  Pain score: 0  Pain management: adequate  Airway patency: patent  Anesthetic complications: no  Cardiovascular status: acceptable  Respiratory status: acceptable  Hydration status: acceptable  Comments: I have evaluated the patient and meets criteria for discharge from PACU.  Althea Cabot., MD    Final Post Anesthesia Temperature Assessment:  Normothermia (36.0-37.5 degrees C)      INITIAL Post-op Vital signs:   Vitals Value Taken Time   BP 93/60 03/08/22 1329   Temp 36.6 °C (97.8 °F) 03/08/22 1329   Pulse 81 03/08/22 1329   Resp 18 03/08/22 1329   SpO2 97 % 03/08/22 1329

## 2022-03-08 NOTE — ROUTINE PROCESS
William Robin  1940  [id number]    Situation:  Verbal report received from: Akhil Soliz RN  Procedure: Procedure(s):  COLONOSCOPY   :-    Background:    Preoperative diagnosis: FAMILY HX OF COLONIC POLYPS  FAMILY HX OF MALIGNANT NEOPLASM OF GI TRACK  Postoperative diagnosis: 1. Normal        :  Dr. Barb Morrow  Assistant(s): Endoscopy Technician-1: Ivon Shell  Endoscopy RN-1: Ekaterina Morgan    Specimens: * No specimens in log *  H. Pylori  no    Assessment:  Intra-procedure medications   Anesthesia gave intra-procedure sedation and medications, see anesthesia flow sheet yes    Intravenous fluids: NS@ KVO     Vital signs stable     Abdominal assessment: round and soft     Recommendation:  Discharge patient per MD order.     Family or Friend   Permission to share finding with family or friend yes

## 2022-03-08 NOTE — H&P
Pre-Endoscopy H&P   Chief complaint: screening or surveillance of previous colon polyps    HPI:  Patient presents for procedure. The indication for the procedure, the patient's history and the patient's current medications are reviewed prior to the procedure and that data is reported on the endoscopy note in the chart to which this document is attached. Any significant complaints with regard to organ systems will be noted, and if not mentioned then a review of systems is not contributory. Past Medical History:   Diagnosis Date    Arthritis     back, neck, hands    Breast cancer ([address]     Left    Breast cancer ([address] 02/15/2022    Left Mucinous Carcinoma    Chronic pain     fibromyalgia - neck/hands    Fibromyalgia     History of breast cancer     Left breast cancer treated with surgery and radiation.  Hyperlipemia     Hypertension     Other ill-defined conditions(799.89)     IBD - no problem with this at this time    S/P radiation therapy     Vaginal delivery     Four times. Four episiotomies. Past Surgical History:   Procedure Laterality Date    COLONOSCOPY N/A 6/15/2016    COL[facility]COPY [facility] by Sara Olson MD at [facility] ENDOSCOPY    HX APPENDECTOMY      HX BREAST BIOPSY Left     stereo  benign    HX BREAST BIOPSY Left 02/15/2022    US guided Mucinous Carcinoma    HX BREAST LUMPECTOMY Left     Ca.  HX GI      colonoscopies - last     HX GYN      hysterectomy    HX HEENT      childhood tonsillectomy    HX OTHER SURGICAL      Hemorrhoidectomy performed in a doctor's office [address].  VT BREAST SURGERY PROCEDURE UNLISTED      L lumpectomy x2    VASCULAR SURGERY PROCEDURE UNLIST      vein sgy on L leg     Social   Social History     Tobacco Use    Smoking status: Former Smoker     Packs/day: 0.25     Quit date: 11/3/1984     Years since quittin.3    Smokeless tobacco: Never Used   Substance Use Topics    Alcohol use:  Yes Alcohol/week: 2.5 standard drinks     Types: 3 Standard drinks or equivalent per week     Comment: occasional      Family History   Problem Relation Age of Onset    Heart Disease Mother     Cancer Father         colon    Colon Cancer Father     Heart Disease Brother         mi x2    Heart Attack Brother     Anesth Problems Neg Hx       Allergies   Allergen Reactions    Augmentin [Amoxicillin-Pot Clavulanate] Diarrhea    Indocin [Indomethacin Sodium] Nausea and Vomiting    Tolectin 600 Swelling      Prior to Admission Medications   Prescriptions Last Dose Informant Patient Reported? Taking? Biotin 2,500 mcg cap 3/1/2022 at Unknown time  Yes Yes   Sig: Take  by mouth. GLUC/CHND/OM3/DHA/EPA/FISH/STR (GLUCOSAMINE CHONDROITIN PLUS PO)   Yes No   Sig: Take  by mouth daily. Patient not taking: Reported on 2/15/2022   LACTOBACILLUS ACIDOPHILUS (PROBIOTIC PO)   Yes No   Sig: Take  by mouth. Takes one po once daily. Patient not taking: Reported on 2/15/2022   MULTIVITAMIN PO 3/1/2022 at Unknown time  Yes Yes   Sig: Take  by mouth. Takes one po once daily. Potassium Gluconate 595 mg (99 mg) tablet   Yes No   Sig: Take 595 mg by mouth daily. Patient not taking: Reported on 2/15/2022   atorvastatin (LIPITOR) 10 mg tablet 3/1/2022 at Unknown time  Yes Yes   Sig: Take 10 mg by mouth daily. Indications: HYPERCHOLESTEROLEMIA   cholecalciferol (Vitamin D3) 25 mcg (1,000 unit) cap 3/1/2022 at Unknown time  Yes Yes   Sig: Take  by mouth daily. folic acid 779 mcg tablet   Yes No   Sig: Take 400 mcg by mouth daily. Patient not taking: Reported on 2/28/2022   magnesium 250 mg tab   Yes No   Sig: Take  by mouth daily. Patient not taking: Reported on 2/15/2022   metoprolol (LOPRESSOR) 50 mg tablet   Yes No   Sig: Take 50 mg by mouth daily.  Indications: HYPERTENSION   Patient not taking: Reported on 2/15/2022   oxyCODONE IR (ROXICODONE) 5 mg immediate release tablet   No No   Sig: Take 1-2 Tabs by mouth every four (4) hours as needed for Pain. Max Daily Amount: 60 mg. Patient not taking: Reported on 2/15/2022   spironolactone (ALDACTONE) 50 mg tablet 3/1/2022 at Unknown time  Yes Yes   Sig: Take 50 mg by mouth daily. Facility-Administered Medications: None       PHYSICAL EXAM:  The patient is examined immediately prior to the procedure. Visit Vitals  BP (!) 148/80   Pulse 94   Temp 98.8 °F (37.1 °C)   Resp 15   Ht 5' 4\" (1.626 m)   Wt 51.7 kg (114 lb)   SpO2 98%   Breastfeeding No   BMI 19.57 kg/m²     Gen: Appears comfortable, no distress. Pulm: comfortable respirations with no abnormal audible breath sounds  CV: heart regular, well perfused  GI: abdomen flat. ASSESSMENT:  Patient here for procedure. The indication for the procedure, the patient's history and the patient's current medications are reviewed prior to the procedure and that data is reported on the endoscopy note in the chart to which this document is attached. PLAN:  Informed consent discussion held, patient afforded an opportunity to ask questions and all questions answered. After being advised of the risks, benefits, and alternatives, the patient requested that we proceed and indicated so on a written consent form. Will proceed with procedure as planned.   Pretty Mensah MD

## 2022-03-19 PROBLEM — C50.919 BREAST CANCER (HCC): Status: ACTIVE | Noted: 2022-02-15

## 2022-03-22 ENCOUNTER — DOCUMENTATION ONLY (OUTPATIENT)
Dept: SURGERY | Age: 82
End: 2022-03-22

## 2022-03-22 DIAGNOSIS — C50.912: Primary | ICD-10-CM

## 2022-03-22 NOTE — PROGRESS NOTES
Verbally informed patient surgery  [facility]      Date: April 5, 2022  @ 11:00 AM  Arrive: 8:15 AM  report to [employer]  floor outpatient registration day of surgery. PAT: nurse will call patient about pre admission testing and covid testing.   Arrive: nurse gives info:  report to [address] [place] [address], [address]; [address] [phone] following instructions:  NPO after Midnight the night before  Shower in AM, no lotion, deodorant, powder,perfume or makeup  Will need  morning of the surgery    Post op appt: [facility]  04/18/2022 @ 10;15 am

## 2022-03-23 ENCOUNTER — HOSPITAL ENCOUNTER (OUTPATIENT)
Dept: PREADMISSION TESTING | Age: 82
Discharge: HOME | End: 2022-03-23
Attending: SURGERY
Payer: MEDICARE

## 2022-03-23 ENCOUNTER — HOSPITAL ENCOUNTER (OUTPATIENT)
Dept: PREADMISSION TESTING | Age: 82
Discharge: HOME | End: 2022-03-23
Payer: MEDICARE

## 2022-03-23 ENCOUNTER — TRANSCRIBE ORDER (OUTPATIENT)
Dept: REGISTRATION | Age: 82
End: 2022-03-23

## 2022-03-23 VITALS
HEIGHT: 63 IN | BODY MASS INDEX: 20.7 KG/M2 | HEART RATE: 92 BPM | SYSTOLIC BLOOD PRESSURE: 147 MMHG | TEMPERATURE: 98.4 F | WEIGHT: 116.84 LBS | DIASTOLIC BLOOD PRESSURE: 79 MMHG

## 2022-03-23 DIAGNOSIS — U07.1 COVID-19: Primary | ICD-10-CM

## 2022-03-23 DIAGNOSIS — U07.1 COVID-19: ICD-10-CM

## 2022-03-23 DIAGNOSIS — C50.912 MALIGNANT NEOPLASM OF LEFT FEMALE BREAST, UNSPECIFIED ESTROGEN RECEPTOR STATUS, UNSPECIFIED SITE OF BREAST (HCC): Primary | ICD-10-CM

## 2022-03-23 LAB
ANION GAP SERPL CALC-SCNC: 2 MMOL/L (ref 5–15)
BASOPHILS # BLD: 0 K/UL (ref 0–0.1)
BASOPHILS NFR BLD: 0 % (ref 0–1)
BUN SERPL-MCNC: 13 MG/DL (ref 6–20)
BUN/CREAT SERPL: 15 (ref 12–20)
CALCIUM SERPL-MCNC: 10.5 MG/DL (ref 8.5–10.1)
CHLORIDE SERPL-SCNC: 109 MMOL/L (ref 97–108)
CO2 SERPL-SCNC: 29 MMOL/L (ref 21–32)
CREAT SERPL-MCNC: 0.84 MG/DL (ref 0.55–1.02)
DIFFERENTIAL METHOD BLD: NORMAL
EOSINOPHIL # BLD: 0.1 K/UL (ref 0–0.4)
EOSINOPHIL NFR BLD: 1 % (ref 0–7)
ERYTHROCYTE [DISTWIDTH] IN BLOOD BY AUTOMATED COUNT: 12 % (ref 11.5–14.5)
GLUCOSE SERPL-MCNC: 109 MG/DL (ref 65–100)
HCT VFR BLD AUTO: 41.1 % (ref 35–47)
HGB BLD-MCNC: 13.4 G/DL (ref 11.5–16)
IMM GRANULOCYTES # BLD AUTO: 0 K/UL (ref 0–0.04)
IMM GRANULOCYTES NFR BLD AUTO: 0 % (ref 0–0.5)
LYMPHOCYTES # BLD: 1.6 K/UL (ref 0.8–3.5)
LYMPHOCYTES NFR BLD: 23 % (ref 12–49)
MCH RBC QN AUTO: 31.6 PG (ref 26–34)
MCHC RBC AUTO-ENTMCNC: 32.6 G/DL (ref 30–36.5)
MCV RBC AUTO: 96.9 FL (ref 80–99)
MONOCYTES # BLD: 0.3 K/UL (ref 0–1)
MONOCYTES NFR BLD: 5 % (ref 5–13)
NEUTS SEG # BLD: 4.8 K/UL (ref 1.8–8)
NEUTS SEG NFR BLD: 71 % (ref 32–75)
NRBC # BLD: 0 K/UL (ref 0–0.01)
NRBC BLD-RTO: 0 PER 100 WBC
PLATELET # BLD AUTO: 319 K/UL (ref 150–400)
PMV BLD AUTO: 9.6 FL (ref 8.9–12.9)
POTASSIUM SERPL-SCNC: 4.3 MMOL/L (ref 3.5–5.1)
RBC # BLD AUTO: 4.24 M/UL (ref 3.8–5.2)
SODIUM SERPL-SCNC: 140 MMOL/L (ref 136–145)
WBC # BLD AUTO: 6.8 K/UL (ref 3.6–11)

## 2022-03-23 PROCEDURE — 85025 COMPLETE CBC W/AUTO DIFF WBC: CPT

## 2022-03-23 PROCEDURE — 80048 BASIC METABOLIC PNL TOTAL CA: CPT

## 2022-03-23 PROCEDURE — 93005 ELECTROCARDIOGRAM TRACING: CPT

## 2022-03-23 PROCEDURE — U0005 INFEC AGEN DETEC AMPLI PROBE: HCPCS

## 2022-03-23 PROCEDURE — 36415 COLL VENOUS BLD VENIPUNCTURE: CPT

## 2022-03-23 RX ORDER — GUAIFENESIN 100 MG/5ML
81 LIQUID (ML) ORAL DAILY
COMMUNITY
End: 2022-03-29

## 2022-03-23 NOTE — PERIOP NOTES
[facility]'S  PREOPERATIVE INSTRUCTIONS    Surgery Date:   3/7    [facility] staff will call you between 4 PM- 8 PM the day before surgery with your arrival time. If your surgery is on a Monday, we will call you the preceding Friday. Please call [phone] after 8 PM if you did not receive your arrival time. 1. Please report to [facility] Patient Access/Admitting on the 1st floor. Bring your insurance card, photo identification, and any copayment ( if applicable). 2. If you are going home the same day of your surgery, you must have a responsible adult to drive you home. You need to have a responsible adult to stay with you the first 24 hours after surgery and you should not drive a car for 24 hours following your surgery. 3. Do NOT eat any solid foods after midnight the night before surgery including candy, mint or gum. You may drink clear liquids from midnight until 1 hour prior to your arrival. You may drink up to 12 ounces at one time every 4 hours. Please note special instructions, if applicable, below for medications. 4. Do NOT drink alcohol or smoke 24 hours before surgery. STOP smoking for 14 days prior as it helps with breathing and healing after surgery. 5. If you are being admitted to the hospital, please leave personal belongings/luggage in your car until you have an assigned hospital room number. 6. Please wear comfortable clothes. Wear your glasses instead of contacts. We ask that all money, jewelry and valuables be left at home. Wear no make up, particularly mascara, the day of surgery. 7.  All body piercings, rings, and jewelry need to be removed and left at home. Please remove any nail polish or artifical nails from your fingernails. Please wear your hair loose or down. Please no pony-tails, buns, or any metal hair accessories. If you shower the morning of surgery, please do not apply any lotions or powders afterwards. You may wear deodorant, unless having breast surgery.   Do not shave any body area within 24 hours of your surgery. 8. Please follow all instructions to avoid any potential surgical cancellation. 9. Should your physical condition change, (i.e. fever, cold, flu, etc.) please notify your surgeon as soon as possible. 10. It is important to be on time. If a situation occurs where you may be delayed, please call:  [phone] / [phone] on the day of surgery. 11. The Preadmission Testing staff can be reached at [phone]. 12. Special instructions: BRING IN LIVING WILL DAY OF SURGERY      Current Outpatient Medications   Medication Sig    aspirin 81 mg chewable tablet Take 81 mg by mouth daily.  cholecalciferol (Vitamin D3) 25 mcg (1,000 unit) cap Take  by mouth daily.  Biotin 2,500 mcg cap Take  by mouth.  GLUC/CHND/OM3/DHA/EPA/FISH/STR (GLUCOSAMINE CHONDROITIN PLUS PO) Take 1,500 mg by mouth daily.  spironolactone (ALDACTONE) 50 mg tablet Take 50 mg by mouth daily.  MULTIVITAMIN PO Take  by mouth. Takes one po once daily.  atorvastatin (LIPITOR) 10 mg tablet Take 10 mg by mouth every morning. Indications: high cholesterol     No current facility-administered medications for this encounter. 1. YOU MUST ONLY TAKE THESE MEDICATIONS THE MORNING OF SURGERY WITH A SIP OF WATER: ATORVASTATIN  2. MEDICATIONS TO TAKE THE MORNING OF SURGERY ONLY IF NEEDED: NONE  3. HOLD these prescription medications BEFORE Surgery:   4. Ask your surgeon/prescribing physician about when/if to STOP taking these medications:NONE  5. Stop all vitamins, herbal medicines and Aspirin containing products 7 days prior to surgery. Stop any non-steroidal anti-inflammatory drugs (i.e. Ibuprofen, Naproxen, Advil, Aleve) 3 days before surgery. You may take Tylenol. 6. If you are currently taking Plavix, Coumadin,or any other blood-thinning/anticoagulant medication contact your prescribing physician for instructions.     Eating and Drinking Before Surgery     You may eat a regular dinner at the usual time on the day before your surgery.  Do NOT eat any solid foods after midnight unless your arrival time at the hospital is 3pm or later.  You may drink clear liquids only from 12 midnight until 1 hours prior to your arrival time at the hospital on the day of your surgery. Do NOT drink alcohol.  Clear liquids include:  o Water  o Fruit juices without pulp( i.e. apple juice)  o Carbonated beverages  o Black coffee (no cream/milk)  o Tea (no cream/milk)  o Gatorade   You may drink up to 12-16 ounces at one time every 4 hours between the hours of midnight and 1 hour before your arrival time at the hospital. Example- if your arrival time at the hospital is 6am, you may drink 12-16 ounces of clear liquids no later than 5am.   If your arrival time at the hospital is 3pm or later, you may eat a light breakfast before 8am.   A light breakfast includes:  o Toast or bagel (no butter)  o Black coffee (no cream/milk)  o Tea (no cream/milk)  o Fruit juices without pulp ( i.e. apple juice)  o Do NOT eat meat, eggs, vegetables or fruit   If you have any questions, please contact your surgeon's office. Preventing Infections Before and After  Your Surgery    IMPORTANT INSTRUCTIONS    You play an important role in your health and preparation for surgery. To reduce the germs on your skin you will need to shower with CHG soap (Chorhexidine gluconate 4%) two times before surgery. CHG soap (Hibiclens, Hex-A-Clens or store brand)   CHG soap will be provided at your Preadmission Testing (PAT) appointment.  If you do not have a PAT appointment before surgery, you may arrange to  CHG soap from our office or purchase CHG soap at a pharmacy, grocery or department store.  You need to purchase TWO 4 ounce bottles to use for your 2 showers. Steps to follow:  1.  Wash your hair with your normal shampoo and your body with regular soap and rinse well to remove shampoo and soap from your skin.  2. Wet a clean washcloth and turn off the shower. 3. Put CHG soap on washcloth and apply to your entire body from the neck down. Do not use on your head, face or private parts(genitals). Do not use CHG soap on open sores, wounds or areas of skin irritation. 4. Wash you body gently for 5 minutes. Do not wash your skin too hard. This soap does not create lather. Pay special attention to your underarms and from your belly button to your feet. 5. Turn the shower back on and rinse well to get CHG soap off your body. 6. Pat your skin dry with a clean, dry towel. Do not apply lotions or moisturizer. 7. Put on clean clothes and sleep on fresh bed sheets and do not allow pets to sleep with you. Shower with CHG soap 2 times before your surgery   The evening before your surgery   The morning of your surgery      Tips to help prevent infections after your surgery:  1. Protect your surgical wound from germs:  ? Hand washing is the most important thing you and your caregivers can do to prevent infections. ? Keep your bandage clean and dry! ? Do not touch your surgical wound. 2. Use clean, freshly washed towels and washcloths every time you shower; do not share bath linens with others. 3. Until your surgical wound is healed, wear clothing and sleep on bed linens each day that are clean and freshly washed. 4. Do not allow pets to sleep in your bed with you or touch your surgical wound. 5. Do not smoke  smoking delays wound healing. This may be a good time to stop smoking. 6. If you have diabetes, it is important for you to manage your blood sugar levels properly before your surgery as well as after your surgery. Poorly managed blood sugar levels slow down wound healing and prevent you from healing completely.         [facility]   Instructions for Pre-Surgery COVID-19 Testing     Across our ministry we have established standard guidelines to ensure the health and safety of our patients, residents and associates as we resume elective services for patients. All patients presenting for surgery are required to have a COVID-19 test result within 96 hours of their scheduled surgery. [facility] is providing this test free of charge to the patient. Instructions for COVID-19 Testing:     Patients will receive a call from Pre-Admission Testing 4-5 days prior to surgery to schedule a date and time to come to the [address] [place] [address] Drive for their COVID-19 test   Patients are advised to self-quarantine after testing until their scheduled surgery   Once on site, patients will be registered and receive COVID test in their vehicle   If a patient is scheduled for normal Pre Admission Testing 96 hours from date of surgery, the patient will still have their COVID test done at the [address] [place] [address] located at [address] [place] [address] Positive results will be shared with the surgeon and anesthesiologist and may result in cancellation of the elective procedure    Testing Hours and Location:   Address:  [address] [place] [address] Up Pre Admission [facility] in the Discharge Lot on [employer] [facility] (Map Attached)  [address], [address]   Hours: Monday- Friday 7a-3p    PAT Phone Number: [phone]              Patient Information Regarding COVID Restrictions    Patients are advised to self-quarantine after COVID testing up to the day of the scheduled procedure. Day of Procedure     Please park in the parking deck or any designated visitor parking lot.  Enter the facility through the Main Entrance of the hospital.   A temperature check and appropriate symptom/exposure screening will be done prior to entry to the facility.  On the day of surgery, please provide the cell phone number of the person who will be waiting for you to the Patient Access representative at the time of registration.  Please wear a mask on the day of your procedure.    We are now allowing one designated visitor per stay. Pediatric patients may have 2 designated visitors. This one person may come in with you on the day of your procedure.  No visitors under the age of 13.  The designated visitor must also wear a mask.  Once your procedure and the immediate recovery period is completed, a nurse in the recovery area will contact your designated visitor to inform them of your room number or to otherwise review other pertinent information regarding your care.  Social distancing practices are to be adhered to in waiting areas and the cafeteria. The patient was contacted  in person. She verbalized understanding of all instructions does not  need reinforcement.

## 2022-03-24 LAB
ATRIAL RATE: 80 BPM
CALCULATED P AXIS, ECG09: 64 DEGREES
CALCULATED R AXIS, ECG10: -39 DEGREES
CALCULATED T AXIS, ECG11: 31 DEGREES
DIAGNOSIS, 93000: NORMAL
P-R INTERVAL, ECG05: 158 MS
Q-T INTERVAL, ECG07: 376 MS
QRS DURATION, ECG06: 78 MS
QTC CALCULATION (BEZET), ECG08: 433 MS
SARS-COV-2, XPLCVT: NOT DETECTED
SOURCE, COVRS: NORMAL
VENTRICULAR RATE, ECG03: 80 BPM

## 2022-03-25 ENCOUNTER — DOCUMENTATION ONLY (OUTPATIENT)
Dept: SURGERY | Age: 82
End: 2022-03-25

## 2022-03-25 NOTE — PROGRESS NOTES
I called [facility] to confirm Monday March 28th surgery, it is now in the ASU, NOT the main . Will forward to Dr Erasmo Ash.

## 2022-03-28 ENCOUNTER — HOSPITAL ENCOUNTER (OUTPATIENT)
Age: 82
Setting detail: OBSERVATION
Discharge: HOME | End: 2022-03-29
Attending: SURGERY | Admitting: SURGERY
Payer: MEDICARE

## 2022-03-28 ENCOUNTER — ANESTHESIA EVENT (OUTPATIENT)
Dept: MEDSURG UNIT | Age: 82
End: 2022-03-28
Payer: MEDICARE

## 2022-03-28 ENCOUNTER — ANESTHESIA (OUTPATIENT)
Dept: MEDSURG UNIT | Age: 82
End: 2022-03-28
Payer: MEDICARE

## 2022-03-28 DIAGNOSIS — Z90.12 S/P MASTECTOMY, LEFT: ICD-10-CM

## 2022-03-28 DIAGNOSIS — C50.912 RECURRENT BREAST CANCER, LEFT (HCC): ICD-10-CM

## 2022-03-28 DIAGNOSIS — C50.912 MALIGNANT NEOPLASM OF LEFT FEMALE BREAST, UNSPECIFIED ESTROGEN RECEPTOR STATUS, UNSPECIFIED SITE OF BREAST (HCC): Primary | ICD-10-CM

## 2022-03-28 PROCEDURE — 2709999900 HC NON-CHARGEABLE SUPPLY: Performed by: SURGERY

## 2022-03-28 PROCEDURE — 77030008462 HC STPLR SKN PROX J&J -A: Performed by: SURGERY

## 2022-03-28 PROCEDURE — 76030000001 HC AMB SURG OR TIME 1 TO 1.5: Performed by: SURGERY

## 2022-03-28 PROCEDURE — 74011250636 HC RX REV CODE- 250/636: Performed by: SURGERY

## 2022-03-28 PROCEDURE — 77030002933 HC SUT MCRYL J&J -A: Performed by: SURGERY

## 2022-03-28 PROCEDURE — 77030019908 HC STETH ESOPH SIMS -A: Performed by: NURSE ANESTHETIST, CERTIFIED REGISTERED

## 2022-03-28 PROCEDURE — 74011250637 HC RX REV CODE- 250/637: Performed by: ANESTHESIOLOGY

## 2022-03-28 PROCEDURE — 77030013079 HC BLNKT BAIR HGGR 3M -A: Performed by: NURSE ANESTHETIST, CERTIFIED REGISTERED

## 2022-03-28 PROCEDURE — 76060000062 HC AMB SURG ANES 1 TO 1.5 HR: Performed by: SURGERY

## 2022-03-28 PROCEDURE — 19303 MAST SIMPLE COMPLETE: CPT | Performed by: SURGERY

## 2022-03-28 PROCEDURE — 74011250636 HC RX REV CODE- 250/636: Performed by: NURSE ANESTHETIST, CERTIFIED REGISTERED

## 2022-03-28 PROCEDURE — 88307 TISSUE EXAM BY PATHOLOGIST: CPT

## 2022-03-28 PROCEDURE — 77030026438 HC STYL ET INTUB CARD -A: Performed by: NURSE ANESTHETIST, CERTIFIED REGISTERED

## 2022-03-28 PROCEDURE — 74011250637 HC RX REV CODE- 250/637: Performed by: SURGERY

## 2022-03-28 PROCEDURE — 77030040361 HC SLV COMPR DVT MDII -B: Performed by: SURGERY

## 2022-03-28 PROCEDURE — 74011000258 HC RX REV CODE- 258: Performed by: ANESTHESIOLOGY

## 2022-03-28 PROCEDURE — 74011000250 HC RX REV CODE- 250: Performed by: SURGERY

## 2022-03-28 PROCEDURE — 77030002966 HC SUT PDS J&J -A: Performed by: SURGERY

## 2022-03-28 PROCEDURE — 76210000037 HC AMBSU PH I REC 2 TO 2.5 HR: Performed by: SURGERY

## 2022-03-28 PROCEDURE — 77030002986 HC SUT PROL J&J -A: Performed by: SURGERY

## 2022-03-28 PROCEDURE — 77030008684 HC TU ET CUF COVD -B: Performed by: NURSE ANESTHETIST, CERTIFIED REGISTERED

## 2022-03-28 PROCEDURE — 94760 N-INVAS EAR/PLS OXIMETRY 1: CPT

## 2022-03-28 PROCEDURE — 77030040506 HC DRN WND MDII -A: Performed by: SURGERY

## 2022-03-28 PROCEDURE — C9290 INJ, BUPIVACAINE LIPOSOME: HCPCS | Performed by: SURGERY

## 2022-03-28 PROCEDURE — 74011000250 HC RX REV CODE- 250: Performed by: ANESTHESIOLOGY

## 2022-03-28 PROCEDURE — 74011250636 HC RX REV CODE- 250/636: Performed by: ANESTHESIOLOGY

## 2022-03-28 PROCEDURE — 77030031139 HC SUT VCRL2 J&J -A: Performed by: SURGERY

## 2022-03-28 PROCEDURE — 38525 BIOPSY/REMOVAL LYMPH NODES: CPT | Performed by: SURGERY

## 2022-03-28 PROCEDURE — 38900 IO MAP OF SENT LYMPH NODE: CPT | Performed by: SURGERY

## 2022-03-28 PROCEDURE — 77030041680 HC PNCL ELECSURG SMK EVAC CNMD -B: Performed by: SURGERY

## 2022-03-28 PROCEDURE — G0378 HOSPITAL OBSERVATION PER HR: HCPCS

## 2022-03-28 PROCEDURE — 77030011267 HC ELECTRD BLD COVD -A: Performed by: SURGERY

## 2022-03-28 PROCEDURE — 77030002996 HC SUT SLK J&J -A: Performed by: SURGERY

## 2022-03-28 PROCEDURE — 88331 PATH CONSLTJ SURG 1 BLK 1SPC: CPT

## 2022-03-28 RX ORDER — EPHEDRINE SULFATE/0.9% NACL/PF 50 MG/5 ML
SYRINGE (ML) INTRAVENOUS AS NEEDED
Status: DISCONTINUED | OUTPATIENT
Start: 2022-03-28 | End: 2022-03-28 | Stop reason: HOSPADM

## 2022-03-28 RX ORDER — ONDANSETRON 2 MG/ML
INJECTION INTRAMUSCULAR; INTRAVENOUS AS NEEDED
Status: DISCONTINUED | OUTPATIENT
Start: 2022-03-28 | End: 2022-03-28 | Stop reason: HOSPADM

## 2022-03-28 RX ORDER — SCOLOPAMINE TRANSDERMAL SYSTEM 1 MG/1
1 PATCH, EXTENDED RELEASE TRANSDERMAL ONCE
Status: DISCONTINUED | OUTPATIENT
Start: 2022-03-28 | End: 2022-03-28 | Stop reason: HOSPADM

## 2022-03-28 RX ORDER — DEXAMETHASONE SODIUM PHOSPHATE 4 MG/ML
INJECTION, SOLUTION INTRA-ARTICULAR; INTRALESIONAL; INTRAMUSCULAR; INTRAVENOUS; SOFT TISSUE AS NEEDED
Status: DISCONTINUED | OUTPATIENT
Start: 2022-03-28 | End: 2022-03-28 | Stop reason: HOSPADM

## 2022-03-28 RX ORDER — SODIUM CHLORIDE, SODIUM LACTATE, POTASSIUM CHLORIDE, CALCIUM CHLORIDE 600; 310; 30; 20 MG/100ML; MG/100ML; MG/100ML; MG/100ML
25 INJECTION, SOLUTION INTRAVENOUS CONTINUOUS
Status: DISCONTINUED | OUTPATIENT
Start: 2022-03-28 | End: 2022-03-29 | Stop reason: HOSPADM

## 2022-03-28 RX ORDER — FENTANYL CITRATE 50 UG/ML
INJECTION, SOLUTION INTRAMUSCULAR; INTRAVENOUS AS NEEDED
Status: DISCONTINUED | OUTPATIENT
Start: 2022-03-28 | End: 2022-03-28 | Stop reason: HOSPADM

## 2022-03-28 RX ORDER — FENTANYL CITRATE 50 UG/ML
25 INJECTION, SOLUTION INTRAMUSCULAR; INTRAVENOUS
Status: COMPLETED | OUTPATIENT
Start: 2022-03-28 | End: 2022-03-28

## 2022-03-28 RX ORDER — ACETAMINOPHEN 325 MG/1
650 TABLET ORAL
Status: DISCONTINUED | OUTPATIENT
Start: 2022-03-28 | End: 2022-03-29 | Stop reason: HOSPADM

## 2022-03-28 RX ORDER — SODIUM CHLORIDE 0.9 % (FLUSH) 0.9 %
5-40 SYRINGE (ML) INJECTION EVERY 8 HOURS
Status: DISCONTINUED | OUTPATIENT
Start: 2022-03-28 | End: 2022-03-29 | Stop reason: HOSPADM

## 2022-03-28 RX ORDER — DIAZEPAM 10 MG/2ML
2.5 INJECTION INTRAMUSCULAR
Status: DISCONTINUED | OUTPATIENT
Start: 2022-03-28 | End: 2022-03-29 | Stop reason: HOSPADM

## 2022-03-28 RX ORDER — SODIUM CHLORIDE 0.9 % (FLUSH) 0.9 %
5-40 SYRINGE (ML) INJECTION AS NEEDED
Status: DISCONTINUED | OUTPATIENT
Start: 2022-03-28 | End: 2022-03-29 | Stop reason: HOSPADM

## 2022-03-28 RX ORDER — HYDROMORPHONE HYDROCHLORIDE 1 MG/ML
INJECTION, SOLUTION INTRAMUSCULAR; INTRAVENOUS; SUBCUTANEOUS AS NEEDED
Status: DISCONTINUED | OUTPATIENT
Start: 2022-03-28 | End: 2022-03-28 | Stop reason: HOSPADM

## 2022-03-28 RX ORDER — SODIUM CHLORIDE, SODIUM LACTATE, POTASSIUM CHLORIDE, CALCIUM CHLORIDE 600; 310; 30; 20 MG/100ML; MG/100ML; MG/100ML; MG/100ML
50 INJECTION, SOLUTION INTRAVENOUS CONTINUOUS
Status: DISCONTINUED | OUTPATIENT
Start: 2022-03-28 | End: 2022-03-28 | Stop reason: HOSPADM

## 2022-03-28 RX ORDER — HYDROMORPHONE HYDROCHLORIDE 1 MG/ML
0.2 INJECTION, SOLUTION INTRAMUSCULAR; INTRAVENOUS; SUBCUTANEOUS
Status: DISCONTINUED | OUTPATIENT
Start: 2022-03-28 | End: 2022-03-28 | Stop reason: HOSPADM

## 2022-03-28 RX ORDER — PROPOFOL 10 MG/ML
INJECTION, EMULSION INTRAVENOUS AS NEEDED
Status: DISCONTINUED | OUTPATIENT
Start: 2022-03-28 | End: 2022-03-28 | Stop reason: HOSPADM

## 2022-03-28 RX ORDER — LIDOCAINE HYDROCHLORIDE 20 MG/ML
INJECTION, SOLUTION EPIDURAL; INFILTRATION; INTRACAUDAL; PERINEURAL AS NEEDED
Status: DISCONTINUED | OUTPATIENT
Start: 2022-03-28 | End: 2022-03-28 | Stop reason: HOSPADM

## 2022-03-28 RX ORDER — GABAPENTIN 100 MG/1
100 CAPSULE ORAL 2 TIMES DAILY
Status: DISCONTINUED | OUTPATIENT
Start: 2022-03-28 | End: 2022-03-29 | Stop reason: HOSPADM

## 2022-03-28 RX ORDER — OXYCODONE HYDROCHLORIDE 5 MG/1
5 TABLET ORAL
Status: DISCONTINUED | OUTPATIENT
Start: 2022-03-28 | End: 2022-03-28 | Stop reason: HOSPADM

## 2022-03-28 RX ORDER — SODIUM CHLORIDE 0.9 % (FLUSH) 0.9 %
5-40 SYRINGE (ML) INJECTION AS NEEDED
Status: DISCONTINUED | OUTPATIENT
Start: 2022-03-28 | End: 2022-03-28 | Stop reason: HOSPADM

## 2022-03-28 RX ORDER — CEFAZOLIN SODIUM 1 G/3ML
INJECTION, POWDER, FOR SOLUTION INTRAMUSCULAR; INTRAVENOUS AS NEEDED
Status: DISCONTINUED | OUTPATIENT
Start: 2022-03-28 | End: 2022-03-28 | Stop reason: HOSPADM

## 2022-03-28 RX ORDER — MORPHINE SULFATE 2 MG/ML
2 INJECTION, SOLUTION INTRAMUSCULAR; INTRAVENOUS
Status: DISCONTINUED | OUTPATIENT
Start: 2022-03-28 | End: 2022-03-28 | Stop reason: HOSPADM

## 2022-03-28 RX ORDER — SUCCINYLCHOLINE CHLORIDE 20 MG/ML
INJECTION INTRAMUSCULAR; INTRAVENOUS AS NEEDED
Status: DISCONTINUED | OUTPATIENT
Start: 2022-03-28 | End: 2022-03-28 | Stop reason: HOSPADM

## 2022-03-28 RX ORDER — SODIUM CHLORIDE, SODIUM LACTATE, POTASSIUM CHLORIDE, CALCIUM CHLORIDE 600; 310; 30; 20 MG/100ML; MG/100ML; MG/100ML; MG/100ML
INJECTION, SOLUTION INTRAVENOUS
Status: DISCONTINUED | OUTPATIENT
Start: 2022-03-28 | End: 2022-03-28 | Stop reason: HOSPADM

## 2022-03-28 RX ORDER — PHENYLEPHRINE HCL IN 0.9% NACL 0.4MG/10ML
SYRINGE (ML) INTRAVENOUS AS NEEDED
Status: DISCONTINUED | OUTPATIENT
Start: 2022-03-28 | End: 2022-03-28 | Stop reason: HOSPADM

## 2022-03-28 RX ORDER — SODIUM CHLORIDE 0.9 % (FLUSH) 0.9 %
5-40 SYRINGE (ML) INJECTION EVERY 8 HOURS
Status: DISCONTINUED | OUTPATIENT
Start: 2022-03-28 | End: 2022-03-28 | Stop reason: HOSPADM

## 2022-03-28 RX ORDER — ONDANSETRON 2 MG/ML
4 INJECTION INTRAMUSCULAR; INTRAVENOUS
Status: DISCONTINUED | OUTPATIENT
Start: 2022-03-28 | End: 2022-03-29 | Stop reason: HOSPADM

## 2022-03-28 RX ORDER — NALOXONE HYDROCHLORIDE 0.4 MG/ML
0.4 INJECTION, SOLUTION INTRAMUSCULAR; INTRAVENOUS; SUBCUTANEOUS AS NEEDED
Status: DISCONTINUED | OUTPATIENT
Start: 2022-03-28 | End: 2022-03-29 | Stop reason: HOSPADM

## 2022-03-28 RX ORDER — LIDOCAINE HYDROCHLORIDE 10 MG/ML
0.1 INJECTION, SOLUTION EPIDURAL; INFILTRATION; INTRACAUDAL; PERINEURAL AS NEEDED
Status: DISCONTINUED | OUTPATIENT
Start: 2022-03-28 | End: 2022-03-28 | Stop reason: HOSPADM

## 2022-03-28 RX ORDER — DIPHENHYDRAMINE HYDROCHLORIDE 50 MG/ML
12.5 INJECTION, SOLUTION INTRAMUSCULAR; INTRAVENOUS
Status: DISCONTINUED | OUTPATIENT
Start: 2022-03-28 | End: 2022-03-29 | Stop reason: HOSPADM

## 2022-03-28 RX ORDER — ROCURONIUM BROMIDE 10 MG/ML
INJECTION, SOLUTION INTRAVENOUS AS NEEDED
Status: DISCONTINUED | OUTPATIENT
Start: 2022-03-28 | End: 2022-03-28 | Stop reason: HOSPADM

## 2022-03-28 RX ORDER — HYDROMORPHONE HYDROCHLORIDE 1 MG/ML
0.5 INJECTION, SOLUTION INTRAMUSCULAR; INTRAVENOUS; SUBCUTANEOUS
Status: DISCONTINUED | OUTPATIENT
Start: 2022-03-28 | End: 2022-03-29 | Stop reason: HOSPADM

## 2022-03-28 RX ORDER — ONDANSETRON 2 MG/ML
4 INJECTION INTRAMUSCULAR; INTRAVENOUS AS NEEDED
Status: DISCONTINUED | OUTPATIENT
Start: 2022-03-28 | End: 2022-03-28 | Stop reason: HOSPADM

## 2022-03-28 RX ADMIN — HYDROMORPHONE HYDROCHLORIDE 0.5 MG: 1 INJECTION, SOLUTION INTRAMUSCULAR; INTRAVENOUS; SUBCUTANEOUS at 16:35

## 2022-03-28 RX ADMIN — SODIUM CHLORIDE, PRESERVATIVE FREE 10 ML: 5 INJECTION INTRAVENOUS at 22:00

## 2022-03-28 RX ADMIN — SODIUM CHLORIDE, POTASSIUM CHLORIDE, SODIUM LACTATE AND CALCIUM CHLORIDE: 600; 310; 30; 20 INJECTION, SOLUTION INTRAVENOUS at 07:37

## 2022-03-28 RX ADMIN — SODIUM CHLORIDE 50 MCG/MIN: 9 INJECTION, SOLUTION INTRAVENOUS at 08:25

## 2022-03-28 RX ADMIN — GABAPENTIN 100 MG: 100 CAPSULE ORAL at 13:48

## 2022-03-28 RX ADMIN — Medication 80 MCG: at 08:10

## 2022-03-28 RX ADMIN — FENTANYL CITRATE 25 MCG: 50 INJECTION, SOLUTION INTRAMUSCULAR; INTRAVENOUS at 09:49

## 2022-03-28 RX ADMIN — ONDANSETRON HYDROCHLORIDE 4 MG: 2 INJECTION, SOLUTION INTRAMUSCULAR; INTRAVENOUS at 08:38

## 2022-03-28 RX ADMIN — GABAPENTIN 100 MG: 100 CAPSULE ORAL at 18:34

## 2022-03-28 RX ADMIN — FENTANYL CITRATE 25 MCG: 50 INJECTION, SOLUTION INTRAMUSCULAR; INTRAVENOUS at 09:44

## 2022-03-28 RX ADMIN — DIAZEPAM 2.5 MG: 5 INJECTION, SOLUTION INTRAMUSCULAR; INTRAVENOUS at 10:49

## 2022-03-28 RX ADMIN — HYDROMORPHONE HYDROCHLORIDE 0.1 MG: 1 INJECTION, SOLUTION INTRAMUSCULAR; INTRAVENOUS; SUBCUTANEOUS at 07:59

## 2022-03-28 RX ADMIN — Medication 40 MCG: at 08:18

## 2022-03-28 RX ADMIN — HYDROMORPHONE HYDROCHLORIDE 0.2 MG: 1 INJECTION, SOLUTION INTRAMUSCULAR; INTRAVENOUS; SUBCUTANEOUS at 10:05

## 2022-03-28 RX ADMIN — PROPOFOL 150 MG: 10 INJECTION, EMULSION INTRAVENOUS at 07:45

## 2022-03-28 RX ADMIN — Medication 120 MCG: at 07:53

## 2022-03-28 RX ADMIN — Medication 10 MG: at 08:27

## 2022-03-28 RX ADMIN — HYDROMORPHONE HYDROCHLORIDE 0.2 MG: 1 INJECTION, SOLUTION INTRAMUSCULAR; INTRAVENOUS; SUBCUTANEOUS at 10:22

## 2022-03-28 RX ADMIN — DEXMEDETOMIDINE HYDROCHLORIDE 4 MCG: 100 INJECTION, SOLUTION, CONCENTRATE INTRAVENOUS at 07:59

## 2022-03-28 RX ADMIN — DEXMEDETOMIDINE HYDROCHLORIDE 4 MCG: 100 INJECTION, SOLUTION, CONCENTRATE INTRAVENOUS at 08:01

## 2022-03-28 RX ADMIN — ACETAMINOPHEN 650 MG: 325 TABLET ORAL at 23:04

## 2022-03-28 RX ADMIN — SODIUM CHLORIDE, POTASSIUM CHLORIDE, SODIUM LACTATE AND CALCIUM CHLORIDE 50 ML/HR: 600; 310; 30; 20 INJECTION, SOLUTION INTRAVENOUS at 07:30

## 2022-03-28 RX ADMIN — SUCCINYLCHOLINE CHLORIDE 120 MG: 20 INJECTION, SOLUTION INTRAMUSCULAR; INTRAVENOUS at 07:45

## 2022-03-28 RX ADMIN — Medication 120 MCG: at 08:04

## 2022-03-28 RX ADMIN — HYDROMORPHONE HYDROCHLORIDE 0.5 MG: 1 INJECTION, SOLUTION INTRAMUSCULAR; INTRAVENOUS; SUBCUTANEOUS at 19:52

## 2022-03-28 RX ADMIN — CEFAZOLIN 2 G: 330 INJECTION, POWDER, FOR SOLUTION INTRAMUSCULAR; INTRAVENOUS at 07:47

## 2022-03-28 RX ADMIN — HYDROMORPHONE HYDROCHLORIDE 0.1 MG: 1 INJECTION, SOLUTION INTRAMUSCULAR; INTRAVENOUS; SUBCUTANEOUS at 08:04

## 2022-03-28 RX ADMIN — LIDOCAINE HYDROCHLORIDE 60 MG: 20 INJECTION, SOLUTION EPIDURAL; INFILTRATION; INTRACAUDAL; PERINEURAL at 07:45

## 2022-03-28 RX ADMIN — FENTANYL CITRATE 50 MCG: 50 INJECTION, SOLUTION INTRAMUSCULAR; INTRAVENOUS at 07:38

## 2022-03-28 RX ADMIN — DEXAMETHASONE SODIUM PHOSPHATE 4 MG: 4 INJECTION, SOLUTION INTRAMUSCULAR; INTRAVENOUS at 07:52

## 2022-03-28 RX ADMIN — FENTANYL CITRATE 25 MCG: 50 INJECTION, SOLUTION INTRAMUSCULAR; INTRAVENOUS at 09:59

## 2022-03-28 RX ADMIN — ROCURONIUM BROMIDE 5 MG: 10 SOLUTION INTRAVENOUS at 07:45

## 2022-03-28 RX ADMIN — FENTANYL CITRATE 25 MCG: 50 INJECTION, SOLUTION INTRAMUSCULAR; INTRAVENOUS at 09:54

## 2022-03-28 NOTE — ANESTHESIA PREPROCEDURE EVALUATION
Relevant Problems   No relevant active problems       Anesthetic History   No history of anesthetic complications            Review of Systems / Medical History  Patient summary reviewed, nursing notes reviewed and pertinent labs reviewed    Pulmonary  Within defined limits                 Neuro/Psych   Within defined limits           Cardiovascular    Hypertension: well controlled              Exercise tolerance: >4 METS     GI/Hepatic/Renal                Endo/Other        Arthritis     Other Findings              Physical Exam    Airway  Mallampati: I  TM Distance: > 6 cm  Neck ROM: normal range of motion   Mouth opening: Normal     Cardiovascular    Rhythm: regular           Dental  No notable dental hx       Pulmonary                 Abdominal         Other Findings            Anesthetic Plan    ASA: 2  Anesthesia type: general          Induction: Intravenous  Anesthetic plan and risks discussed with: Patient

## 2022-03-28 NOTE — PROGRESS NOTES
Bedside and Verbal shift change report given to Sharon Bazzi RN (oncoming nurse) by Patsy Knutson RN (offgoing nurse). Report included the following information SBAR, Kardex, ED Summary, OR Summary, Procedure Summary, Intake/Output, MAR, Accordion, Recent Results and Med Rec Status.

## 2022-03-28 NOTE — PERIOP NOTES
Patient pain free when entering PACU. Encouraged her to let RN know if she had discomfort or in need of anything. Patient tearful, inquired by RN if she was hurting. Started to medicate for pain level 6/10 at 0944. She has received 6 doses of pain medication since 0944. Pain level has decreased from 6/10-5/10. In talking with patient she did not want to bother staff for pain medication, so there was a delay with her start of receiving medication for pain control in PACU. Talked with patient regarding the importance of pain control for rest and healing. Encouraged her to advise  floor RN when pain medication needed, not to wait for pain to escalate before asking for medication because it takes longer to control pain.

## 2022-03-28 NOTE — ANESTHESIA POSTPROCEDURE EVALUATION
Post-Anesthesia Evaluation and Assessment    Patient: Rocael Cantu MRN: [id number]  SSN: [id number]    YOB: 1940  Age: [de-identified] y.o. Sex: female      I have evaluated the patient and they are stable and ready for discharge from the PACU. Cardiovascular Function/Vital Signs  Visit Vitals  /70   Pulse 76   Temp 36.5 °C (97.7 °F)   Resp 17   Ht 5' 3\" (1.6 m)   Wt 52.6 kg (116 lb)   SpO2 93%   BMI 20.55 kg/m²       Patient is status post General anesthesia for Procedure(s):  LEFT BREAST SIMPLE MASTECTOMY (INJECTION OF MAGTRACE IN PREOP). Nausea/Vomiting: None    Postoperative hydration reviewed and adequate. Pain:  Pain Scale 1: Numeric (0 - 10) (03/28/22 1100)  Pain Intensity 1: 3 (03/28/22 1100)   Managed    Neurological Status:   Neuro (WDL): Within Defined Limits (03/28/22 1100)  Neuro  Neurologic State: Alert (03/28/22 1100)  LUE Motor Response: Purposeful (03/28/22 1100)  LLE Motor Response: Purposeful (03/28/22 1100)  RUE Motor Response: Purposeful (03/28/22 1100)  RLE Motor Response: Purposeful (03/28/22 1100)   At baseline    Mental Status, Level of Consciousness: Alert and  oriented to person, place, and time    Pulmonary Status:   O2 Device: None (Room air) (03/28/22 0920)   Adequate oxygenation and airway patent    Complications related to anesthesia: None    Post-anesthesia assessment completed. No concerns    Signed By: Clarice Araya MD     March 28, 2022              Procedure(s):  LEFT BREAST SIMPLE MASTECTOMY (INJECTION OF MAGTRACE IN PREOP). general    <BSHSIANPOST>    INITIAL Post-op Vital signs:   Vitals Value Taken Time   /70 03/28/22 1130   Temp 36.5 °C (97.7 °F) 03/28/22 0914   Pulse 74 03/28/22 1137   Resp 13 03/28/22 1137   SpO2 99 % 03/28/22 1137   Vitals shown include unvalidated device data.

## 2022-03-28 NOTE — OP NOTES
[address]  OPERATIVE REPORT    Name:  Mirian Perez  MR#:  [id number]  :  1940  ACCOUNT #:  [de-identified]  DATE OF SERVICE:  2022      PREOPERATIVE DIAGNOSIS:  Recurrent left breast cancer. POSTOPERATIVE DIAGNOSIS:  Recurrent left breast cancer. PROCEDURES PERFORMED:  Left breast simple mastectomy, left deep axillary sentinel node biopsy. SURGEON:  Vianca Thornton MD    ASSISTANT:  Anjali Wayne. ANESTHESIA:  General.    COMPLICATIONS:  None. SPECIMENS REMOVED:  1. Left axillary sentinel node #1.  2.  Left breast mastectomy. IMPLANTS:  No implants. ESTIMATED BLOOD LOSS:  Less than 50. DRAINS:  19-Angolan Fam-Christiansen. FINDINGS:  [place] lymph node negative on frozen. INDICATIONS FOR PROCEDURE:  An 82-year-old female who had left breast cancer treated with breast conservation therapy and had a recurrence and needed a mastectomy and deep axillary sentinel lymph node biopsy. PROCEDURE:  The patient was seen in the preoperative holding area where surgical site was marked by surgeon. Informed consent was obtained. 3 mL of Magtrace was injected in the retroareolar region on the left breast with a 25-gauge needle. She tolerated this well. She went to the operating room, laid in supine position where general endotracheal anesthesia was induced. Left breast prepped and draped in usual fashion and time-out was performed. Elliptical incision was made with a 10-blade encompassing the prior scar. Bovie cautery was used to dissect superior skin flap and laterally to incise the axillary fascia. One sentinel node was identified using Sentimag guidance. This was excised with LigaSure and sent for frozen section and found to be negative. Next, medial, inferior and lateral skin flaps were dissected using Bovie cautery. The breast was removed in total from the chest wall in a medial-to-lateral fashion incorporating the pectoral fascia.   This was marked short superior, long stitch lateral, and sent for permanent pathology. Cavity was irrigated with normal saline. A mixture of 20 mL of Exparel with 30 mL of 0.25% Marcaine plain was mixed together, injected in the chest wall and skin. The 19-Frisian round GLORIA drain was placed in the cavity and secured with 3-0 Prolene. The incision was closed with interrupted 3-0 Vicryl and running 3-0 PDS and running 3-0 Monocryl. Steri-Strips were placed on the incision as well as a pressure dressing and a bra. All sponge, needle, and instruments counts were correct. The patient went to Recovery in stable condition.         Peace Medellin MD      [id number]  D:  03/28/2022 8:49  T:  03/28/2022 9:13  JOB #:  [id number]

## 2022-03-28 NOTE — PROGRESS NOTES
Medicare Outpatient Observation Notice (MOON) provided to patient/representative with verbal explanation of the notice. Time allotted for questions regarding the notice. Patient /representative provided a completed copy of the MOON notice. Copy placed on bedside chart. Phuong Watkins, Care Management Assistant.

## 2022-03-28 NOTE — PERIOP NOTES
TRANSFER - OUT REPORT:    Verbal report given to Genie on Missy Headings  being transferred to [facility] for routine post - op       Report consisted of patients Situation, Background, Assessment and   Recommendations(SBAR). Information from the following report(s) SBAR, OR Summary, Intake/Output and MAR was reviewed with the receiving nurse. Lines:   Peripheral IV 03/28/22 Right Arm (Active)   Site Assessment Clean, dry, & intact 03/28/22 1105   Phlebitis Assessment 0 03/28/22 1105   Infiltration Assessment 0 03/28/22 1105   Dressing Status Clean, dry, & intact 03/28/22 1105   Dressing Type Tape;Transparent 03/28/22 1105   Hub Color/Line Status Infusing 03/28/22 1105        Opportunity for questions and clarification was provided.       Patient transported with:   Registered Nurse     Clothing and overnight bag to floor

## 2022-03-28 NOTE — INTERVAL H&P NOTE
Update History & Physical    The Patient's History and Physical of 2/28/2022  left mastectomy, sln biopsy, injection magtrace    was reviewed with the patient and I examined the patient. There was no change. The surgical site was confirmed by the patient and me. Plan:  The risk, benefits, expected outcome, and alternative to the recommended procedure have been discussed with the patient. Patient understands and wants to proceed with the procedure.     Electronically signed by Uzma Madison MD on 3/28/2022 at 7:20 AM

## 2022-03-28 NOTE — PROGRESS NOTES
TRANSFER - IN REPORT:    Verbal report received from Noe Merino Rik Blackburn (name) on Delilah Tejada  being received from 7E(unit) for routine post - op      Report consisted of patients Situation, Background, Assessment and   Recommendations(SBAR). Information from the following report(s) SBAR, Kardex, OR Summary, Intake/Output and MAR was reviewed with the receiving nurse. Opportunity for questions and clarification was provided. Assessment completed upon patients arrival to unit and care assumed.

## 2022-03-28 NOTE — BRIEF OP NOTE
Brief Postoperative Note    Patient: Jerrod Cheema  YOB: 1940  MRN: [id number]    Date of Procedure: 3/28/2022     Pre-Op Diagnosis: RECURRENT LEFT BREAST CANCER    Post-Op Diagnosis: Same as preoperative diagnosis. Procedure(s):  LEFT BREAST SIMPLE MASTECTOMY (INJECTION OF MAGTRACE IN PREOP)    Surgeon(s):   Amita Moyer MD    Surgical Assistant: Surg Asst-1: Amirah Slater    Anesthesia: General     Estimated Blood Loss (mL): Minimal    Complications: None    Specimens:   ID Type Source Tests Collected by Time Destination   1 : LEFT AXILLARY SENTINEL NODE #1 Frozen Section Lymph Node  Amita Moyer MD 3/28/2022 2454 Pathology   2 : LEFT BREAST MASTECTOMY Fresh Breast  Amita Moyer MD 3/28/2022 0511 Pathology        Implants: * No implants in log *    Drains:   Fam-Christiansen Drain 03/28/22 Left Breast (Active)       Findings: sln negative     Electronically Signed by Anila Bauer MD on 3/28/2022 at 8:45 AM  Dictated stat

## 2022-03-29 VITALS
RESPIRATION RATE: 16 BRPM | DIASTOLIC BLOOD PRESSURE: 66 MMHG | BODY MASS INDEX: 20.55 KG/M2 | SYSTOLIC BLOOD PRESSURE: 145 MMHG | HEART RATE: 50 BPM | TEMPERATURE: 97.5 F | WEIGHT: 116 LBS | HEIGHT: 63 IN | OXYGEN SATURATION: 94 %

## 2022-03-29 PROCEDURE — G0378 HOSPITAL OBSERVATION PER HR: HCPCS

## 2022-03-29 PROCEDURE — 74011250636 HC RX REV CODE- 250/636: Performed by: SURGERY

## 2022-03-29 PROCEDURE — 74011000250 HC RX REV CODE- 250: Performed by: SURGERY

## 2022-03-29 PROCEDURE — 74011250637 HC RX REV CODE- 250/637: Performed by: SURGERY

## 2022-03-29 RX ORDER — GABAPENTIN 100 MG/1
100 CAPSULE ORAL 2 TIMES DAILY
Qty: 30 CAPSULE | Refills: 0 | Status: SHIPPED | OUTPATIENT
Start: 2022-03-29

## 2022-03-29 RX ORDER — TRAMADOL HYDROCHLORIDE 50 MG/1
50 TABLET ORAL
Status: DISCONTINUED | OUTPATIENT
Start: 2022-03-29 | End: 2022-03-29 | Stop reason: HOSPADM

## 2022-03-29 RX ORDER — TRAMADOL HYDROCHLORIDE 50 MG/1
50 TABLET ORAL
Qty: 20 TABLET | Refills: 0 | Status: SHIPPED | OUTPATIENT
Start: 2022-03-29 | End: 2022-04-05

## 2022-03-29 RX ADMIN — HYDROMORPHONE HYDROCHLORIDE 0.5 MG: 1 INJECTION, SOLUTION INTRAMUSCULAR; INTRAVENOUS; SUBCUTANEOUS at 04:26

## 2022-03-29 RX ADMIN — TRAMADOL HYDROCHLORIDE 50 MG: 50 TABLET ORAL at 09:07

## 2022-03-29 RX ADMIN — ACETAMINOPHEN 650 MG: 325 TABLET ORAL at 08:44

## 2022-03-29 RX ADMIN — SODIUM CHLORIDE, PRESERVATIVE FREE 10 ML: 5 INJECTION INTRAVENOUS at 06:00

## 2022-03-29 RX ADMIN — GABAPENTIN 100 MG: 100 CAPSULE ORAL at 08:44

## 2022-03-29 NOTE — PROGRESS NOTES
Bedside and Verbal shift change report given to Robyn Perez  (oncoming nurse) by Dayan Correia RN  (offgoing nurse). Report included the following information SBAR, Kardex, Intake/Output and MAR.     0830- clot noted at insertion site of XANDER drain. Unable to remove with stripping the  Line. pt rates pain 7/10. Given Tylenol & scheduled Gabapentin. Pt declines IV pain mediation at this time. Requesting oral.   [id number]- MD Remigio Trujillo called, messaged left.  Zoey- MD Remigio Trujillo updated. Orders for pain medication received. I have reviewed discharge instructions with the patient. The patient verbalized understanding. XANDER drain care reviewed pt able to demonstrate proper care. Pt given copy of discharge instructions, XANDER drain cup with record sheet, Xander drain pouch, and extra dressing supplies.

## 2022-03-29 NOTE — DISCHARGE INSTRUCTIONS
Discharge Instructions from Dr. Sherrie Dunaway    · I will call you with the pathology results, typically within 1 week from today. · You may shower, but no hot tubs, swimming pools, or baths until your incision is healed. · No heavy lifting with the affected extremity (nothing greater than 5 pounds), and limit its use for the next 4-5 days. · You may use an ice pack for comfort for the next couple of days, but do not place ice directly on the skin. Rather, use a towel or clothing to serve as a barrier between skin and ice to prevent injury. · If I placed a drain, follow the drain instructions provided, especially as you keep a record of the drain output. · Follow medication instructions carefully. No aspirin, ibuprofen or aleve. · Wear surgical bra for 24 hours, then remove. Wear supportive bra at all times. · You will have bruising and swelling  · Watch for signs of infection as listed below. · Redness  · Swelling  · Drainage from the incision or from your nipple that appears infected  · Fever over 101.5 degrees for consecutive readings, or over 99.5 if you are currently undergoing chemotherapy. · Call our office (number is below) for a follow-up appointment. Call in 1 week with drain output  · If you have any problems, our phone number is [phone]    Patient Education        Surgical Drain Care: Care Instructions  Your Care Instructions     After a surgery, fluid may collect inside your body in the surgical area. This makes an infection or other problems more likely. A surgical drain allows the fluid to flow out. The doctor puts a thin, flexible rubber tube into the area of your body where the fluid is likely to collect. The rubber tube carries the fluid outside your body. The most common type of surgical drain carries the fluid into a collection bulb that you empty. This is called a Fam-Christiansen (GLORIA) drain. The drain uses suction created by the bulb to pull the fluid from your body into the bulb.  The rubber tube will probably be held in place by one or two stitches in your skin. The bulb will probably be attached with a safety pin to your clothes or near the bandage so that it doesn't flip around or pull on the stitches. Another type of drain is called a Penrose drain. This type of drain doesn't have a bulb. Instead, the end of the tube is open. That allows the fluid to drain onto a dressing taped to your skin. The drain may be kept in place next to your skin with a stitch or a safety pin in the tube. When you first get the drain, the fluid will be bloody. It will change color from red to pink to a light yellow or clear as the wound heals and the fluid starts to go away. Your doctor may give you information on when you no longer need the drain and when it will be removed. Follow-up care is a key part of your treatment and safety. Be sure to make and go to all appointments, and call your doctor if you are having problems. It's also a good idea to know your test results and keep a list of the medicines you take. How do you empty the bulb of a Fam-Christiansen drain? Follow any instructions your doctor gives you. How often you empty the bulb depends on how much fluid is draining. Empty the bulb when it is half full. 1. Wash your hands with soap and water. 2. Take the plug out of the bulb. 3. Empty the bulb. If your doctor asks you to measure the fluid, empty the fluid into a measuring cup, and write down the color and how much you collected. Your doctor will want to know this information. 4. Clean the plug with alcohol. 5. Squeeze the bulb until it is flat. This removes all the air from the bulb. You may need to put the bulb on a table or a counter to flatten it. 6. Keep the bulb flat, and put the plug in. The bulb should stay flat after you put the plug back in. This creates the suction that pulls the fluid into the bulb. 7. Empty the fluid into the toilet. 8. Wash your hands.   How do you change the dressing around your surgical drain? You may have a dressing (bandage). The dressing is often made of gauze pads held on with tape. Your doctor will tell you how often to change it. 1. Wash your hands with soap and water. 2. Take off the dressing from around the drain. 3. Clean the drain site and the skin around it with soap and water. Use gauze or a cotton swab. 4. When the site is dry, put on a new dressing. The way your dressing is put on depends on what kind of drain you have. You will get instructions for your type of drain. 5. Wash your hands again with soap and water. Your doctor may ask you to keep track of your dressing changes. Write down the time of day and the amount and color of the fluid on the dressing. How do you help prevent clogs in your surgical drain? Squeezing or \"milking\" the tube of your surgical drain can help prevent clogs so that it drains correctly. Your doctor will tell you when you need to do this. In general, you do this when:  · You see a clot in the tube that prevents fluid from draining. The clot may look like a dark, stringy lining. · You see fluid leaking around the tube where it goes into the skin. Follow these steps for milking the tube. 1. Use one hand to hold and pinch the tube where it leaves the skin. 2. With the thumb and first finger of your other hand, pinch the tube just below where you're holding it. 3. Slowly and firmly push your thumb and first finger down the tubing toward the end of the tube. 4. Repeat this as many times as needed to move the clot. If you have a Fam-Christiansen (GLORIA) drain, the clot should move down the tube and into the bulb. If you have a Penrose drain, the clot should move into the dressing. When should you call for help? Call your doctor now or seek immediate medical care if:    · You have signs of infection, such as:  ? Increased pain, swelling, warmth, or redness around the area. ?  Red streaks leading from the area.  ? Pus draining from the area. ? A fever.     · You see a sudden change in the color or smell of the drainage.     · The tube is coming loose where it leaves your skin. Watch closely for changes in your health, and be sure to contact your doctor if:    · You see a lot of fluid around the drain.     · You cannot remove a clot from the tube by milking the tube. Where can you learn more? Go to http://www.gray.com/  Enter K117 in the search box to learn more about \"Surgical Drain Care: Care Instructions. \"  Current as of: July 1, 2021               Content Version: 13.2  © 8619-7244 [employer]. Care instructions adapted under license by [employer] (which disclaims liability or warranty for this information). If you have questions about a medical condition or this instruction, always ask your healthcare professional. [address] any warranty or liability for your use of this information.

## 2022-03-29 NOTE — PROGRESS NOTES
HISTORY OF PRESENT ILLNESS  Sylvester Frnacis is a [de-identified] y.o. female. HPI  Pod 1 left mastectomy, sln biospy   Doing well   ROS  na  Physical Exam   Visit Vitals  /71 (BP 1 Location: Right upper arm, BP Patient Position: At rest)   Pulse 81   Temp 98.5 °F (36.9 °C)   Resp 16   Ht 5' 3\" (1.6 m)   Wt 116 lb (52.6 kg)   SpO2 96%   BMI 20.55 kg/m²     Date 03/28/22 0700 - 03/29/22 0659 03/29/22 0700 - 03/30/22 0659   Shift 9894-2684 2562-1479 24 Hour Total 0677-8887 4767-2651 24 Hour Total   INTAKE   I.V.(mL/kg/hr) 900(1.4)  900(0.7)        I.V. 300  300        Volume (lactated Ringers infusion) 600  600      Shift Total(mL/kg) 900(17.1)  900(17.1)      OUTPUT   Urine(mL/kg/hr) 1000(1.6) 500(0.8) 1500(1.2)        Urine Voided 5948 997 8824        Urine Occurrence(s)    1 x  1 x   Drains 45 20 65        Output (ml) (Fam-Christiansen Drain 03/28/22 Left Breast) 45 20 65      Blood 50  50        Estimated Blood Loss 50  50      Shift Total(mL/kg) 1095(20.8) 520(9.9) 1615(30.7)      NET -195 -631 -168      Weight (kg) 52.6 52.6 52.6 52.6 52.6 52.6     Exam no hematoma     ASSESSMENT and PLAN    ICD-10-CM ICD-9-CM    1. Malignant neoplasm of left female breast, unspecified estrogen receptor status, unspecified site of breast (HCC)  C50.912 174.9    2.  Recurrent breast cancer, left ([address]  C50.912 174.9      - dc home   Call in 1 week drain output

## 2022-03-31 ENCOUNTER — DOCUMENTATION ONLY (OUTPATIENT)
Dept: SURGERY | Age: 82
End: 2022-03-31

## 2022-03-31 NOTE — PROGRESS NOTES
The patient's  Edith Alonso called and gave drainage totals from the patient's renato.drain. 3/29/22 pm 25 cc  3/30/22 total 40 cc  Today Thursday 3/31/22 15 cc @ 2:00pm.   The patient will make an appointment to be seen Monday 4/4/22. Bertram Ndiaye was given the patient's info to make an appointment for the patient then call them to verify.

## 2022-03-31 NOTE — PROGRESS NOTES
Spoke with patient   Drain slowing  Surg path node negative  T2 n0  No xrt  Unlikely chemo will refer to med onc

## 2022-04-04 ENCOUNTER — OFFICE VISIT (OUTPATIENT)
Dept: SURGERY | Age: 82
End: 2022-04-04
Payer: COMMERCIAL

## 2022-04-04 DIAGNOSIS — C50.812 CANCER OF OVERLAPPING SITES OF LEFT BREAST (HCC): Primary | ICD-10-CM

## 2022-04-04 PROCEDURE — 99024 POSTOP FOLLOW-UP VISIT: CPT | Performed by: SURGERY

## 2022-04-04 NOTE — PATIENT INSTRUCTIONS
Breast Cancer: Care Instructions  Your Care Instructions     Breast cancer occurs when abnormal cells grow out of control in the breast. These cancer cells can spread within the breast, to nearby lymph nodes and other tissues, and to other parts of the body. Being treated for cancer can weaken your body, and you may feel very tired. Get the rest your body needs so you can feel better. Finding out that you have cancer is scary. You may feel many emotions and may need some help coping. Seek out family, friends, and counselors for support. You also can do things at home to make yourself feel better while you go through treatment. Call the [employer] (4-969.985.2721) or visit its website at [employer] for more information. Follow-up care is a key part of your treatment and safety. Be sure to make and go to all appointments, and call your doctor if you are having problems. It's also a good idea to know your test results and keep a list of the medicines you take. How can you care for yourself at home? · Take your medicines exactly as prescribed. Call your doctor if you think you are having a problem with your medicine. You may get medicine for nausea and vomiting if you have these side effects. · Follow your doctor's instructions to relieve pain. Pain from cancer and surgery can almost always be controlled. Use pain medicine when you first notice pain, before it becomes severe. · Eat healthy food. If you do not feel like eating, try to eat food that has protein and extra calories to keep up your strength and prevent weight loss. Drink liquid meal replacements for extra calories and protein. Try to eat your main meal early. · Get some physical activity every day, but do not get too tired. Keep doing the hobbies you enjoy as your energy allows. · Do not smoke. Smoking can make your cancer worse. If you need help quitting, talk to your doctor about stop-smoking programs and medicines.  These can increase your chances of quitting for good. · Take steps to control your stress and workload. Learn relaxation techniques. ? Share your feelings. Stress and tension affect our emotions. By expressing your feelings to others, you may be able to understand and cope with them. ? Consider joining a support group. Talking about a problem with your spouse, a good friend, or other people with similar problems is a good way to reduce tension and stress. ? Express yourself through art. Try writing, crafts, dance, or art to relieve stress. Some dance, writing, or art groups may be available just for people who have cancer. ? Be kind to your body and mind. Getting enough sleep, eating a healthy diet, and taking time to do things you enjoy can contribute to an overall feeling of balance in your life and can help reduce stress. ? Get help if you need it. Discuss your concerns with your doctor or counselor. · If you are vomiting or have diarrhea:  ? Drink plenty of fluids to prevent dehydration. Choose water and other clear liquids. If you have kidney, heart, or liver disease and have to limit fluids, talk with your doctor before you increase the amount of fluids you drink. ? When you are able to eat, try clear soups, mild foods, and liquids until all symptoms are gone for 12 to 48 hours. Other good choices include dry toast, crackers, cooked cereal, and gelatin dessert, such as Jell-O.  · If you have not already done so, prepare a list of advance directives. Advance directives are instructions to your doctor and family members about what kind of care you want if you become unable to speak or express yourself. When should you call for help? Call 911 anytime you think you may need emergency care. For example, call if:    · You passed out (lost consciousness).    Call your doctor now or seek immediate medical care if:    · You have a fever.     · You have abnormal bleeding.     · You think you have an infection.     · You have new or worse pain.     · You have new symptoms, such as a cough, belly pain, vomiting, diarrhea, or a rash. Watch closely for changes in your health, and be sure to contact your doctor if:    · You are much more tired than usual.     · You have swollen glands in your armpits, groin, or neck.     · You do not get better as expected. Where can you learn more? Go to http://www.alvarez.com/  Enter V321 in the search box to learn more about \"Breast Cancer: Care Instructions. \"  Current as of: September 8, 2021               Content Version: 13.2  © 8619-8745 [employer]. Care instructions adapted under license by [employer] (which disclaims liability or warranty for this information). If you have questions about a medical condition or this instruction, always ask your healthcare professional. [address] any warranty or liability for your use of this information.

## 2022-04-04 NOTE — PROGRESS NOTES
HISTORY OF PRESENT ILLNESS  Devonte Salmon is a [de-identified] y.o. female. HPI  ESTABLISHED patient here for post op follow up, s/p LEFT breast mastectomy. She is doing well. Incision is dry and intact. No pain. GLORIA drainage, 16 cc in the last 24 hours. Drain removed today. 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  Left axillary sentinel node #1, excision:        One benign lymph node (0/1)     2.  Left breast, mastectomy:        Invasive ductal carcinoma, partially mucinous, multifocal   Needle biopsy site with clip   Dermal and upper/ lower outer quadrant mammary scar     INVASIVE CARCINOMA OF THE BREAST: Resection    SPECIMEN       Procedure: Total mastectomy       Specimen Laterality: Left    TUMOR       Histologic Type:  Invasive carcinoma (ductal), partially mucinous       Glandular (Acinar) / Tubular Differentiation: Score 3       Nuclear Pleomorphism: Score 2       Mitotic Rate: Score 1       Overall Grade: Grade 2 (scores of 6 or 7)       Largest Tumor Size:  25 mm (retroareolar)       Tumor Focality: Multiple foci of invasive carcinoma           Number of Foci: 2            Sizes of Other Individual Foci (Millimeters): 5 mm (lower outer   quadrant slide 2I)       Ductal Carcinoma In Situ (DCIS): Present           Architectural Patterns: Cribriform           Nuclear Grade: Grade II (intermediate)    Tumor Extent       Skin: Benign       Nipple DCIS: DCIS does not involve the nipple epidermis       Lymphovascular Invasion: Present       Dermal Lymphovascular Invasion: Not identified       Treatment Effect in the Breast: No known presurgical therapy for this   recurrent tumor    MARGINS       Invasive Carcinoma Margins: Uninvolved by invasive carcinoma           Distance from Closest Margin (Millimeters): Greater than: 3 cm           Closest Margin(s): Inferior anterior       DCIS Margins: Uninvolved by DCIS           Distance from Closest Margin (Millimeters): Greater than: 3 cm           Closest Margin(s): Inferior anterior    LYMPH NODES       Regional Lymph Nodes: Uninvolved by tumor cells           Total Number of Lymph Nodes Examined: 1           Number of [place] Nodes Examined: 1     BIOMARKER ANALYSIS        Performed on needle biopsy S [id number]        Estrogen receptor: Positive (99%)        Progesterone receptor: Positive (97%)        HER-2 IHC: Equivocal        HER-2 FISH: Negative        Ki-67: 10%      PATHOLOGIC STAGE CLASSIFICATION (pTNM, AJCC 8th Edition)       TNM Descriptors: m (multiple foci of invasive carcinoma), r   (recurrent)       Primary Tumor (pT): pT2       Regional Lymph Nodes Modifier: (sn): [place] node(s) evaluated       Regional Lymph Nodes (pN): pN0     elf/3/30/2022   Electronically Signed Out By Sharlette Habermann L. [place]     Family History:  Father had Colon cancer   Review of Systems   All other systems reviewed and are negative. Physical Exam  Vitals and nursing note reviewed. Chest:          Comments: Left mastectomy healing well        ASSESSMENT and PLAN    ICD-10-CM ICD-9-CM    1.  Cancer of overlapping sites of left breast (HCC)  C50.812 174.8      - seroma check 1 week

## 2022-04-05 ENCOUNTER — TELEPHONE (OUTPATIENT)
Dept: SURGERY | Age: 82
End: 2022-04-05

## 2022-04-05 NOTE — TELEPHONE ENCOUNTER
Patient's  called and patient is requesting a refill of her tramadol. She was in for post op yesterday and declined it but last night she was in pain at bedtime. She is asking if she could get the refill. I will notify Dr. Rebeka Bryant. She would like it sent to the [facility] pharmacy.

## 2022-04-05 NOTE — TELEPHONE ENCOUNTER
Called patient to ask how many tramadol's she has left. Patient takes them 2x a day and takes tylenol in between. She still has a few pills left from original prescription. She would like a few more. I advised her to wean down if she could to once a day at bedtime and take tylenol. I let her know I will talk to Dr. Christo Montero or Lisa Maxwell NP to see if they will refill her prescription. I let her know I would call her back.

## 2022-04-06 ENCOUNTER — TELEPHONE (OUTPATIENT)
Dept: SURGERY | Age: 82
End: 2022-04-06

## 2022-04-06 NOTE — TELEPHONE ENCOUNTER
Called patient to let her know that Dr. Asha ruelas prescribed tramadol refill to her [address]. I had to leave a message.

## 2022-04-07 ENCOUNTER — TELEPHONE (OUTPATIENT)
Dept: SURGERY | Age: 82
End: 2022-04-07

## 2022-04-07 DIAGNOSIS — Z90.12 S/P MASTECTOMY, LEFT: Primary | ICD-10-CM

## 2022-04-07 RX ORDER — TRAMADOL HYDROCHLORIDE 50 MG/1
50 TABLET ORAL
Qty: 12 TABLET | Refills: 1 | Status: SHIPPED | OUTPATIENT
Start: 2022-04-07 | End: 2022-04-10

## 2022-04-07 NOTE — TELEPHONE ENCOUNTER
Patient's  called on the phone and left a voicemail. He stated that somebody from our office called yesterday and stated that a refill of tramadol was sent to [facility]. They attempted to  from [facility] and it was never sent.

## 2022-04-11 ENCOUNTER — OFFICE VISIT (OUTPATIENT)
Dept: SURGERY | Age: 82
End: 2022-04-11
Payer: COMMERCIAL

## 2022-04-11 DIAGNOSIS — C50.912 RECURRENT BREAST CANCER, LEFT (HCC): ICD-10-CM

## 2022-04-11 DIAGNOSIS — C50.912 RECURRENT BREAST CANCER, LEFT (HCC): Primary | ICD-10-CM

## 2022-04-11 DIAGNOSIS — Z90.12 S/P MASTECTOMY, LEFT: Primary | ICD-10-CM

## 2022-04-11 PROCEDURE — 99024 POSTOP FOLLOW-UP VISIT: CPT | Performed by: SURGERY

## 2022-04-11 NOTE — PATIENT INSTRUCTIONS
Breast Cancer: Care Instructions  Your Care Instructions     Breast cancer occurs when abnormal cells grow out of control in the breast. These cancer cells can spread within the breast, to nearby lymph nodes and other tissues, and to other parts of the body. Being treated for cancer can weaken your body, and you may feel very tired. Get the rest your body needs so you can feel better. Finding out that you have cancer is scary. You may feel many emotions and may need some help coping. Seek out family, friends, and counselors for support. You also can do things at home to make yourself feel better while you go through treatment. Call the [employer] (6-716.902.2109) or visit its website at [employer] for more information. Follow-up care is a key part of your treatment and safety. Be sure to make and go to all appointments, and call your doctor if you are having problems. It's also a good idea to know your test results and keep a list of the medicines you take. How can you care for yourself at home? · Take your medicines exactly as prescribed. Call your doctor if you think you are having a problem with your medicine. You may get medicine for nausea and vomiting if you have these side effects. · Follow your doctor's instructions to relieve pain. Pain from cancer and surgery can almost always be controlled. Use pain medicine when you first notice pain, before it becomes severe. · Eat healthy food. If you do not feel like eating, try to eat food that has protein and extra calories to keep up your strength and prevent weight loss. Drink liquid meal replacements for extra calories and protein. Try to eat your main meal early. · Get some physical activity every day, but do not get too tired. Keep doing the hobbies you enjoy as your energy allows. · Do not smoke. Smoking can make your cancer worse. If you need help quitting, talk to your doctor about stop-smoking programs and medicines.  These can increase your chances of quitting for good. · Take steps to control your stress and workload. Learn relaxation techniques. ? Share your feelings. Stress and tension affect our emotions. By expressing your feelings to others, you may be able to understand and cope with them. ? Consider joining a support group. Talking about a problem with your spouse, a good friend, or other people with similar problems is a good way to reduce tension and stress. ? Express yourself through art. Try writing, crafts, dance, or art to relieve stress. Some dance, writing, or art groups may be available just for people who have cancer. ? Be kind to your body and mind. Getting enough sleep, eating a healthy diet, and taking time to do things you enjoy can contribute to an overall feeling of balance in your life and can help reduce stress. ? Get help if you need it. Discuss your concerns with your doctor or counselor. · If you are vomiting or have diarrhea:  ? Drink plenty of fluids to prevent dehydration. Choose water and other clear liquids. If you have kidney, heart, or liver disease and have to limit fluids, talk with your doctor before you increase the amount of fluids you drink. ? When you are able to eat, try clear soups, mild foods, and liquids until all symptoms are gone for 12 to 48 hours. Other good choices include dry toast, crackers, cooked cereal, and gelatin dessert, such as Jell-O.  · If you have not already done so, prepare a list of advance directives. Advance directives are instructions to your doctor and family members about what kind of care you want if you become unable to speak or express yourself. When should you call for help? Call 911 anytime you think you may need emergency care. For example, call if:    · You passed out (lost consciousness).    Call your doctor now or seek immediate medical care if:    · You have a fever.     · You have abnormal bleeding.     · You think you have an infection.     · You have new or worse pain.     · You have new symptoms, such as a cough, belly pain, vomiting, diarrhea, or a rash. Watch closely for changes in your health, and be sure to contact your doctor if:    · You are much more tired than usual.     · You have swollen glands in your armpits, groin, or neck.     · You do not get better as expected. Where can you learn more? Go to http://www.alvarez.com/  Enter V321 in the search box to learn more about \"Breast Cancer: Care Instructions. \"  Current as of: September 8, 2021               Content Version: 13.2  © 2887-0067 [employer]. Care instructions adapted under license by [employer] (which disclaims liability or warranty for this information). If you have questions about a medical condition or this instruction, always ask your healthcare professional. [address] any warranty or liability for your use of this information.

## 2022-04-11 NOTE — PROGRESS NOTES
HISTORY OF PRESENT ILLNESS  Clifford Dahl is a [de-identified] y.o. female. HPI ESTABLISHED patient here for POST OP visit for seroma check. She had a LEFT breast mastectomy done on 3/28/22. Patient is doing well . Underarm is slightly swelled but thinks its do to the bra. GLORIA drainage, 16 cc in the last 24 hours. Drain removed 4/4/22.      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 ID 97%, Her 2 2+, Ki 10% FISH negative     3/28/22 - LEFT breast mastectomy, sln biopsy T2 N0 multicentric         Review of Systems   All other systems reviewed and are negative. Physical Exam  Vitals and nursing note reviewed. Chest:          Comments: Left chest incision healing well  steristrips removed  No seroma          ASSESSMENT and PLAN    ICD-10-CM ICD-9-CM    1.  [facility]  C50.912 174.9      - healing well  - refer to med onc for possible endocrine therapy  F/u with np 6 months

## 2022-04-14 ENCOUNTER — DOCUMENTATION ONLY (OUTPATIENT)
Dept: SURGERY | Age: 82
End: 2022-04-14

## 2022-04-14 DIAGNOSIS — C50.912 MALIGNANT NEOPLASM OF LEFT FEMALE BREAST, UNSPECIFIED ESTROGEN RECEPTOR STATUS, UNSPECIFIED SITE OF BREAST (HCC): Primary | ICD-10-CM

## 2022-04-14 NOTE — PROGRESS NOTES
Patient's  had called and left message with questions about \"hormone pill\". Called and spoke to Ms. Amaro. They were under the impression that the medication would be called in and were a little confused. Advised that Dr. Allan Carter is going to send them to see Dr. Angela Lopez who will discuss/manage possible endocrine therapy/AI. Advised that Tyler Kapadia will be reaching out to schedule appointment. Encouraged to call back beginning-mid next week if they do not hear anything about appointment with Dr. Angela Lopez. She was very appreciative of call back.

## 2022-04-21 ENCOUNTER — DOCUMENTATION ONLY (OUTPATIENT)
Dept: SURGERY | Age: 82
End: 2022-04-21

## 2022-04-21 NOTE — PROGRESS NOTES
Faxed signed confirmation of order to [facility]. Confirmation received. Documents in to be scanned.

## 2022-04-27 ENCOUNTER — OFFICE VISIT (OUTPATIENT)
Dept: ONCOLOGY | Age: 82
End: 2022-04-27
Payer: MEDICARE

## 2022-04-27 VITALS
OXYGEN SATURATION: 98 % | HEIGHT: 63 IN | DIASTOLIC BLOOD PRESSURE: 84 MMHG | HEART RATE: 102 BPM | WEIGHT: 114.2 LBS | BODY MASS INDEX: 20.23 KG/M2 | TEMPERATURE: 96.6 F | SYSTOLIC BLOOD PRESSURE: 127 MMHG

## 2022-04-27 DIAGNOSIS — Z17.0 MALIGNANT NEOPLASM OF BREAST IN FEMALE, ESTROGEN RECEPTOR POSITIVE, UNSPECIFIED LATERALITY, UNSPECIFIED SITE OF BREAST (HCC): Primary | ICD-10-CM

## 2022-04-27 DIAGNOSIS — C50.919 MALIGNANT NEOPLASM OF BREAST IN FEMALE, ESTROGEN RECEPTOR POSITIVE, UNSPECIFIED LATERALITY, UNSPECIFIED SITE OF BREAST (HCC): Primary | ICD-10-CM

## 2022-04-27 PROCEDURE — G8420 CALC BMI NORM PARAMETERS: HCPCS | Performed by: INTERNAL MEDICINE

## 2022-04-27 PROCEDURE — G8400 PT W/DXA NO RESULTS DOC: HCPCS | Performed by: INTERNAL MEDICINE

## 2022-04-27 PROCEDURE — G8510 SCR DEP NEG, NO PLAN REQD: HCPCS | Performed by: INTERNAL MEDICINE

## 2022-04-27 PROCEDURE — G8427 DOCREV CUR MEDS BY ELIG CLIN: HCPCS | Performed by: INTERNAL MEDICINE

## 2022-04-27 PROCEDURE — 99205 OFFICE O/P NEW HI 60 MIN: CPT | Performed by: INTERNAL MEDICINE

## 2022-04-27 PROCEDURE — 1101F PT FALLS ASSESS-DOCD LE1/YR: CPT | Performed by: INTERNAL MEDICINE

## 2022-04-27 PROCEDURE — G0463 HOSPITAL OUTPT CLINIC VISIT: HCPCS | Performed by: INTERNAL MEDICINE

## 2022-04-27 PROCEDURE — 1090F PRES/ABSN URINE INCON ASSESS: CPT | Performed by: INTERNAL MEDICINE

## 2022-04-27 PROCEDURE — G8536 NO DOC ELDER MAL SCRN: HCPCS | Performed by: INTERNAL MEDICINE

## 2022-04-27 RX ORDER — ANASTROZOLE 1 MG/1
1 TABLET ORAL DAILY
Qty: 30 TABLET | Refills: 5 | Status: SHIPPED | OUTPATIENT
Start: 2022-04-27 | End: 2022-10-09

## 2022-04-27 NOTE — PATIENT INSTRUCTIONS
Anastrozole (Arimidex®)   This information is about a hormonal therapy used to treat breast cancer called anastrozole, which is also called Arimidex®. Throughout this page we refer to it by its more commonly used name, Arimidex. This information should ideally be read with our general information about breast cancer or secondary breast cancer. Arimidex   Arimidex is a type of hormonal therapy used to treat breast cancer in women who have been through the menopause (change of life). You will see your doctor regularly while you have this treatment so they can monitor its effects. Hormonal therapies interfere with the production or action of particular hormones in the body. Hormones are substances produced naturally in the body. They act as chemical messengers and help control the activity of cells and organs. Aromatase inhibitors   Many breast cancers rely on the hormone oestrogen to grow. These cancers are known as hormone-sensitive breast cancers. In women who have had their menopause, the main source of oestrogen is through the conversion of androgens (sex hormones produced by the adrenal glands) into oestrogens. This is carried out by an enzyme called aromatase. The conversion process is known as aromatisation, and it happens mainly in the fatty tissues of the body. Arimidex is a drug that blocks the process of aromatisation, and so reduces the amount of oestrogen in the body. As less oestrogen reaches the cancer cells, they grow more slowly or stop growing altogether. Drugs that work in this way are known as aromatase inhibitors. Other aromatase inhibitors include letrozole (Femara®) and exemestane (Aromasin®). How Arimidex is taken   Arimidex is a tablet that is taken once a day. It should be swallowed whole with a glass of water, at about the same time each day. It doesn't matter whether this is in the morning or evening.    When it is given   Your doctor will take into account a number of different factors when planning your treatment. Arimidex is used to treat post-menopausal women with hormone-sensitive breast cancer. Early breast cancer   Arimidex may be given to women with early breast cancer (cancer that has not spread) after they have had surgery to remove the cancer. Giving treatment after surgery to reduce the chance of the cancer coming back is known as adjuvant therapy. For some women, Arimidex may be more effective than tamoxifen, and it has different side effects. Studies show that switching to Arimidex after taking tamoxifen for 2-3 years may be better than five years of tamoxifen for some women. Advanced breast cancer   Arimidex can be used to treat women who have breast cancer that has spread to other parts of the body (advanced or secondary breast cancer). It can also be used to treat women whose breast cancer has come back after initial treatment. You might find our information about staging and grading of breast cancer useful. Length of treatment   Your doctors will discuss the length of treatment they feel is appropriate for you. Arimidex may be given over a number of years, or for as long as it is controlling your cancer, depending on your individual situation. Possible side effects   Each person's reaction to any medicine is different. Most people have very few side effects with Arimidex, while others may experience more. The side effects described here won't affect everyone and may be different if you are taking more than one drug. We have outlined the most common side effects, but haven't included those that are rare and therefore unlikely to affect you. If you notice any effects which aren't listed here, discuss them with your doctor or nurse. You may have some of the following side effects, to varying degrees:   Hot flushes and sweats   These are usually mild and may wear off after a period of time.  Sometimes people find it helps to cut down on tea, coffee, nicotine and alcohol. Research suggests that hormones called progestogens or some types of antidepressants may be helpful in controlling this side effect. Your doctor or nurse can discuss this with you. Some people find complementary therapies such as acupuncture helpful. Your GP may be able to give you details about having these on the NHS. If you find your own therapist, make sure that they are properly qualified and registered. You can read more about treatments for menopausal symptoms like hot flushes in our information about managing menopausal symptoms. Vaginal dryness   This may occur while using Arimidex. Gels that can help to overcome the dryness are available. You can buy these from a  or your doctor can prescribe them. Feeling sick (nausea) and being sick (vomiting)   These side effects are rare and usually mild. They can usually be effectively treated, so let your doctor know if you are affected. Feeling sick can often be relieved by taking your tablet with food or at night. Diarrhoea   If you have diarrhoea it's important to drink plenty of fluids. Hair thinning   Some women notice that their hair becomes thinner while taking Arimidex. This is usually mild and the hair grows back at the end of treatment. Headaches   Some people have headaches while taking Arimidex, but this is not common. It's important to drink plenty of fluids. Let your doctor know if you are getting headaches, as they can prescribe medication to help. Skin rashes   Rarely, Arimidex can cause skin rashes. Vaginal bleeding   Some women have some vaginal bleeding, usually in the first few weeks of treatment. This is rare and usually occurs after changing from other hormonal therapies to treatment with Arimidex. If the bleeding continues, tell your doctor or breast care nurse. Joint pains/muscular stiffness   Some women have pain and stiffness in their joints while taking Arimidex.  Let your doctor know if these effects are a problem. You may find it helpful to take mild painkillers. Tiredness (fatigue) and lethargy   Some people can have increased tiredness, especially at the start of treatment. It's important to get plenty of rest. If you are very sleepy you should not drive or operate machinery. Risk of osteoporosis   Women who have, or are at risk of, osteoporosis (weakened bones), should have their bone strength assessed before and during treatment with Arimidex. Some women may need to take bone-strengthening drugs to help prevent osteoporosis developing. Always let your doctor or nurse know about any side effects you have. There are usually ways in which they can be controlled or improved. Things to remember about Arimidex tablets    Arimidex may interact with other medicines. Tell your doctor about any medicines you are taking, including non-prescribed drugs such as complementary therapies, vitamins and herbal drugs.  Keep the tablets in a safe place, out of the reach of children.  Keep the tablets in the original packaging and store them at room temperature, away from heat and direct sunlight.  Don't worry if you forget to take your tablet. Do not take a double dose. The levels of the drug in your blood will not change very much, but try not to miss more than one or two tablets in a row.  If your doctor decides to stop the treatment, return any remaining tablets to the pharmacist. Don't flush them down the toilet or throw them away.  Remember to get a new prescription a few weeks before you run out of tablets, and make sure that you have plenty for holidays. References   This information is based on our Anastrozole (Arimidex®) fact sheet and has been compiled using information from a number of reliable sources, including:    beata Childers. Brenden Langley: The Complete Drug Reference. 36th edition. 2009. [employer]. [employer]. 59th edition. 2010.  [facility] [employer] and [address].  Early and locally advanced breast cancer: diagnosis and treatment. February 2009. National institute for Health and Clinical Excellence (NICE).  electronic Medicines Compendium [facility]). www.medicines. org.uk (accessed September 2010).

## 2022-04-27 NOTE — PROGRESS NOTES
Davied Severs is a [de-identified] y.o. female  Chief Complaint   Patient presents with    New Patient    Breast Cancer   1. Have you been to the ER, urgent care clinic since your last visit? Hospitalized since your last visit? No.  2. Have you seen or consulted any other health care providers outside of the [address] [place] [address] since your last visit? Include any pap smears or colon screening. No.

## 2022-04-27 NOTE — LETTER
4/28/2022    Patient: Christina Wright   YOB: 1940   Date of Visit: 4/27/2022     [place], [address] [address] [address]  Via Fax: [phone]     Nohemi Workman MD  [address]  [address] [facility]  [address] [place]    Dear MD Nohemi Goldsmith MD,      Thank you for referring Ms. Ronnie Dutta to [address] [place] [address] for evaluation. My notes for this consultation are attached. If you have questions, please do not hesitate to call me. I look forward to following your patient along with you.       Sincerely,    Cornelio Pardo, DO

## 2022-09-01 ENCOUNTER — TRANSCRIBE ORDER (OUTPATIENT)
Dept: MAMMOGRAPHY | Age: 82
End: 2022-09-01

## 2022-09-01 ENCOUNTER — HOSPITAL ENCOUNTER (OUTPATIENT)
Dept: MAMMOGRAPHY | Age: 82
Discharge: HOME | End: 2022-09-01
Attending: STUDENT IN AN ORGANIZED HEALTH CARE EDUCATION/TRAINING PROGRAM
Payer: MEDICARE

## 2022-09-01 DIAGNOSIS — Z12.31 VISIT FOR SCREENING MAMMOGRAM: Primary | ICD-10-CM

## 2022-09-01 DIAGNOSIS — Z12.31 VISIT FOR SCREENING MAMMOGRAM: ICD-10-CM

## 2022-09-01 PROCEDURE — 77063 BREAST TOMOSYNTHESIS BI: CPT

## 2022-10-11 ENCOUNTER — OFFICE VISIT (OUTPATIENT)
Dept: SURGERY | Age: 82
End: 2022-10-11
Payer: COMMERCIAL

## 2022-10-11 VITALS — HEIGHT: 63 IN | BODY MASS INDEX: 20.55 KG/M2 | WEIGHT: 116 LBS

## 2022-10-11 DIAGNOSIS — Z85.3 HISTORY OF BREAST CANCER IN FEMALE: ICD-10-CM

## 2022-10-11 DIAGNOSIS — C50.812 CANCER OF OVERLAPPING SITES OF LEFT BREAST (HCC): Primary | ICD-10-CM

## 2022-10-11 DIAGNOSIS — Z90.12 S/P MASTECTOMY, LEFT: ICD-10-CM

## 2022-10-11 PROCEDURE — 1123F ACP DISCUSS/DSCN MKR DOCD: CPT | Performed by: NURSE PRACTITIONER

## 2022-10-11 PROCEDURE — 99213 OFFICE O/P EST LOW 20 MIN: CPT | Performed by: NURSE PRACTITIONER

## 2022-10-11 NOTE — PROGRESS NOTES
HISTORY OF PRESENT ILLNESS  Brent Bonilla is a [de-identified] y.o. female. HPI Established patient presents for follow-up for LEFT breast cancer. Denies breast mass, skin changes, nipple discharge and pain. Breast history -  Referring - Dr. Vladimir Echevarria lumpectomy and XRT; no chemotherapy, unsure about endocrine therapy  2022 - patient palpated a LEFT breast mass  2/2022 - LEFT breast core biopsy - Dr. 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 Franchesca Becker  T2 N0 multicentric disease  No chemotherapy  No XRT  4/2022 - started on anastrozole - Dr. Ernestina Fregoso      Family history -   Father - colon cancer        Past Surgical History:   Procedure Laterality Date    COLONOSCOPY N/A 6/15/2016    COLONOSCOPY performed by Aniya Wong MD at [facility]    COLONOSCOPY N/A 3/8/2022    COLONOSCOPY   :- performed by Shannon Mcclain MD at [facility] ENDOSCOPY    HX APPENDECTOMY      HX BREAST BIOPSY Left 2005    stereo  benign    HX BREAST BIOPSY Left 02/15/2022    US guided Mucinous Carcinoma    HX BREAST LUMPECTOMY Left 1997    Ca. HX GI      colonoscopies - last 2022    HX GYN  1989    hysterectomy    HX HEENT      childhood tonsillectomy    HX MASTECTOMY Left 3/28/2022    LEFT BREAST SIMPLE MASTECTOMY (INJECTION OF MAGTRACE IN PREOP) performed by Fany Booth MD at [facility]    HX OTHER SURGICAL      Hemorrhoidectomy performed in a doctor's office circa 1992. HX TUBAL LIGATION  1972    WI BREAST SURGERY PROCEDURE UNLISTED Left 1997    L lumpectomy x2    VASCULAR SURGERY PROCEDURE UNLIST      vein sgy on L leg         ANDREAS Results (most recent):  Results from Hospital Encounter encounter on 09/01/22    ANDREAS 3D NISH W MAMMO RT SCREENING INCL CAD    Narrative  STUDY: Right unilateral digital screening mammogram with 3-D tomosynthesis    INDICATION:  Screening.  Status post LEFT mastectomy March 2022    COMPARISON: Prior mammograms [id number]    BREAST COMPOSITION: The breasts are heterogeneously dense, which may obscure  small masses. FINDINGS: Patient is status post LEFT mastectomy. Right unilateral digital  screening mammography was performed and is interpreted in conjunction with a  computer assisted detection (CAD) system. Additionally, tomosynthesis of the  right breast in the CC and MLO projections was performed. No suspicious masses  or calcifications are identified. There has been no significant change. Impression  BI-RADS 1: Negative. No mammographic evidence of malignancy. RECOMMENDATIONS:  Next screening mammogram is recommended in one year. The patient will be notified of these results. ROS    Physical Exam  Constitutional:       Appearance: Normal appearance. Chest:   Breasts:     Right: Absent. No mass, nipple discharge, skin change or tenderness. Left: Absent. Musculoskeletal:      Comments: FROM - UE x 2   Lymphadenopathy:      Upper Body:      Right upper body: No supraclavicular or axillary adenopathy. Left upper body: No supraclavicular or axillary adenopathy. Neurological:      Mental Status: She is alert. Psychiatric:         Attention and Perception: Attention normal.         Mood and Affect: Mood normal.         Speech: Speech normal.         Behavior: Behavior normal.       Visit Vitals  Ht 5' 3\" (1.6 m)   Wt 116 lb (52.6 kg)   BMI 20.55 kg/m²         ASSESSMENT and PLAN    ICD-10-CM ICD-9-CM    1. Cancer of overlapping sites of left breast (HCC)  C50.812 174.8       2. S/P mastectomy, left  Z90.12 V45.71       3. History of breast cancer in female  Z85.3 V10.3         Normal exam with no evidence of breast malignancy. Has new bras/prosthesis and does not need additional RX at this time. Taking AI and has follow-up with Dr. Amanda Noguera. RSmammogram 3D due in 9/2023. RTC here in 6 months or sooner PRN. She is comfortable with this plan. All questions answered and she stated understanding. Total time spent for this patient - 20 minutes.

## 2022-10-29 NOTE — ANESTHESIA PREPROCEDURE EVALUATION
Anesthetic History   No history of anesthetic complications            Review of Systems / Medical History  Patient summary reviewed, nursing notes reviewed and pertinent labs reviewed    Pulmonary  Within defined limits                 Neuro/Psych   Within defined limits           Cardiovascular    Hypertension: well controlled                   GI/Hepatic/Renal  Within defined limits              Endo/Other        Arthritis     Other Findings              Physical Exam    Airway  Mallampati: II  TM Distance: > 6 cm  Neck ROM: normal range of motion   Mouth opening: Normal     Cardiovascular  Regular rate and rhythm,  S1 and S2 normal,  no murmur, click, rub, or gallop             Dental  No notable dental hx       Pulmonary  Breath sounds clear to auscultation               Abdominal  GI exam deferred       Other Findings            Anesthetic Plan    ASA: 2  Anesthesia type: general          Induction: Intravenous  Anesthetic plan and risks discussed with: Patient Patient interviewed in a private space.

## 2022-11-29 ENCOUNTER — OFFICE VISIT (OUTPATIENT)
Dept: ONCOLOGY | Age: 82
End: 2022-11-29
Payer: MEDICARE

## 2022-11-29 VITALS
TEMPERATURE: 97.3 F | WEIGHT: 114 LBS | BODY MASS INDEX: 20.2 KG/M2 | SYSTOLIC BLOOD PRESSURE: 147 MMHG | HEART RATE: 110 BPM | DIASTOLIC BLOOD PRESSURE: 80 MMHG | OXYGEN SATURATION: 98 % | HEIGHT: 63 IN

## 2022-11-29 DIAGNOSIS — Z17.0 MALIGNANT NEOPLASM OF LEFT BREAST IN FEMALE, ESTROGEN RECEPTOR POSITIVE, UNSPECIFIED SITE OF BREAST (HCC): Primary | ICD-10-CM

## 2022-11-29 DIAGNOSIS — Z79.811 USE OF ANASTROZOLE (ARIMIDEX): ICD-10-CM

## 2022-11-29 DIAGNOSIS — C50.912 MALIGNANT NEOPLASM OF LEFT BREAST IN FEMALE, ESTROGEN RECEPTOR POSITIVE, UNSPECIFIED SITE OF BREAST (HCC): Primary | ICD-10-CM

## 2022-11-29 DIAGNOSIS — M19.90 ARTHRITIS: ICD-10-CM

## 2022-11-29 DIAGNOSIS — Z90.12 HISTORY OF LEFT MASTECTOMY: ICD-10-CM

## 2022-11-29 PROCEDURE — G0463 HOSPITAL OUTPT CLINIC VISIT: HCPCS | Performed by: INTERNAL MEDICINE

## 2022-11-29 NOTE — PROGRESS NOTES
[facility] [place] [address], [address], [place]: [phone]  F: [phone]    Reason for Visit:   Issa Garcia is a [de-identified] y.o. female seen today in office for follow up of Left Breast Cancer. 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 VT 97%, Her 2 2+, Ki 10% FISH negative  3/28/22 - LEFT Breast Mastectomy and SLN Biopsy: PATH - T2 N0 multicentric disease   Adjuvant hormonal therapy with Anastrozole 4/22 - Current     STAGE pT2pN0 multicentric     History of Present Illness:   Issa Garcia is a [de-identified] y.o. female seen today in office for follow up of LEFT breast cancer ER+ HER2 negative post mastectomy 3/22. She was last seen here at initial visit in 4/22. She remains on adjuvant hormonal therapy with Anastrozole. She reports that she feels well overall today. She is tolerating Anastrozole well overall. Her appetite and energy levels are good. She denies fever, chills, mouth sores, cough, SOB, CP, nausea, vomiting, diarrhea, and constipation. She has right hip pain and is having hip replaced on 12/8/22. She had a right screening mammogram on 9/1/22 that was negative/good. She has routine HM and labs with her PCP Her  is here today. Past Medical History:   Diagnosis Date    Arthritis     back, neck, hands    Breast cancer ([facility] [address] 1997    Left    Breast cancer ([facility] [address] 02/15/2022    Left Mucinous Carcinoma    Chronic pain     fibromyalgia - neck/hands    Fibromyalgia     History of breast cancer 1997    Left breast cancer treated with surgery and radiation. Hyperlipemia     Hypertension     Other ill-defined conditions(799.89)     IBD - no problem with this at this time    S/P radiation therapy 1997    Vaginal delivery     Four times. Four episiotomies.       Past Surgical History:   Procedure Laterality Date    COLONOSCOPY N/A 6/15/2016    COLONOSCOPY performed by Slava Alvarado MD at [address] [place] [address] ENDOSCOPY    COLONOSCOPY N/A 3/8/2022    COLONOSCOPY   :- performed by Sabina Cobb MD at [address] [place] [address] ENDOSCOPY    HX APPENDECTOMY      HX BREAST BIOPSY Left     stereo  benign    HX BREAST BIOPSY Left 02/15/2022    US guided Mucinous Carcinoma    HX BREAST LUMPECTOMY Left     Ca. HX GI      colonoscopies - last     HX GYN      hysterectomy    HX HEENT      childhood tonsillectomy    HX MASTECTOMY Left 3/28/2022    LEFT BREAST SIMPLE MASTECTOMY (INJECTION OF MAGTRACE IN PREOP) performed by Parul Dover MD at [facility]    HX OTHER SURGICAL      Hemorrhoidectomy performed in a doctor's office circa . HX TUBAL LIGATION      IA BREAST SURGERY PROCEDURE UNLISTED Left     L lumpectomy x2    VASCULAR SURGERY PROCEDURE UNLIST      vein sgy on L leg      Social History     Tobacco Use    Smoking status: Former     Packs/day: 0.25     Types: Cigarettes     Quit date: 11/3/1984     Years since quittin.1    Smokeless tobacco: Never   Substance Use Topics    Alcohol use: Yes     Alcohol/week: 5.0 standard drinks     Types: 3 Standard drinks or equivalent, 2 Shots of liquor per week      Family History   Problem Relation Age of Onset    Heart Disease Mother     Hypertension Mother     Colon Cancer Father     Heart Disease Brother         mi x2    Heart Attack Brother     Other Brother          IN [place]    Anesth Problems Neg Hx      Current Outpatient Medications   Medication Sig    anastrozole (ARIMIDEX) 1 mg tablet TAKE 1 TABLET BY MOUTH ONE TIME DAILY    gabapentin (NEURONTIN) 100 mg capsule Take 1 Capsule by mouth two (2) times a day. Max Daily Amount: 200 mg.    cholecalciferol (VITAMIN D3) 25 mcg (1,000 unit) cap Take  by mouth daily. Biotin 2,500 mcg cap Take  by mouth. GLUC/CHND/OM3/DHA/EPA/FISH/STR (GLUCOSAMINE CHONDROITIN PLUS PO) Take 1,500 mg by mouth daily.     spironolactone (ALDACTONE) 50 mg tablet Take 50 mg by mouth daily. MULTIVITAMIN PO Take  by mouth. Takes one po once daily. atorvastatin (LIPITOR) 10 mg tablet Take 10 mg by mouth every morning. Indications: high cholesterol     No current facility-administered medications for this visit. Allergies   Allergen Reactions    Augmentin [Amoxicillin-Pot Clavulanate] Diarrhea    Indocin [Indomethacin Sodium] Nausea and Vomiting    Tolectin 600 Swelling      Review of Systems:  A complete review of systems was obtained, negative except as described above and as reported on ROS sheet scanned into system. Physical Exam:     Visit Vitals  BP (!) 147/80 (BP 1 Location: Right arm, BP Patient Position: Sitting)   Pulse (!) 110   Temp 97.3 °F (36.3 °C) (Temporal)   Ht 5' 3\" (1.6 m)   Wt 114 lb (51.7 kg)   SpO2 98%   BMI 20.19 kg/m²     ECOG PS: 0 looks younger than stated age  General: No distress  Eyes: Anicteric sclerae  HENT: Atraumatic, wearing mask  Neck: Supple  Respiratory: CTAB, normal respiratory effort  CV: Normal rate, regular rhythm, no murmurs, no peripheral edema  GI: Soft, nontender, nondistended  MS: Normal gait and station. Digits without clubbing or cyanosis. Skin: No rashes, ecchymoses, or petechiae. Normal temperature, turgor, and texture. Psych: Alert, oriented, appropriate affect, normal judgment/insight  Breast: Declined breast exam today as just had with Breast Surgery in 10/22  Neuro: Non focal    Results:     Lab Results   Component Value Date/Time    WBC 6.8 03/23/2022 02:50 PM    HGB 13.4 03/23/2022 02:50 PM    HCT 41.1 03/23/2022 02:50 PM    PLATELET 430 05/33/1299 02:50 PM    MCV 96.9 03/23/2022 02:50 PM    ABS.  NEUTROPHILS 4.8 03/23/2022 02:50 PM     Lab Results   Component Value Date/Time    Sodium 140 03/23/2022 02:50 PM    Potassium 4.3 03/23/2022 02:50 PM    Chloride 109 (H) 03/23/2022 02:50 PM    CO2 29 03/23/2022 02:50 PM    Glucose 109 (H) 03/23/2022 02:50 PM    BUN 13 03/23/2022 02:50 PM    Creatinine 0.84 03/23/2022 02:50 PM    GFR est AA >60 03/23/2022 02:50 PM    GFR est non-AA >60 03/23/2022 02:50 PM    Calcium 10.5 (H) 03/23/2022 02:50 PM     No results found for: TBILI, ALT, AP, TP, ALB, GLOB    2/15/22  FINAL PATHOLOGIC DIAGNOSIS   Left breast, core biopsy:   Small (1.5 mm) focus of mucinous carcinoma, grade 1 (see synoptic report)   ER/NM/HER-2/Ki67 pending; results to be issued in addendum reports   INVASIVE CARCINOMA OF THE BREAST: Biopsy   SPECIMEN   Procedure: Needle biopsy   Specimen Laterality: Left   TUMOR   Histologic Type: Mucinous carcinoma   Glandular (Acinar) / Tubular Differentiation: Score 2   Nuclear Pleomorphism: Score 2   Mitotic Rate: Score 1   Overall Grade: Grade 1 (scores of 3, 4 or 5)   Tumor Size: 1.5 mm   Ductal Carcinoma In Situ (DCIS): Not identified   Lymphovascular Invasion: Not identified   Microcalcifications: Not identified   ER/NM positive, Her2 2+, FISH negative     3/28/22  FINAL PATHOLOGIC DIAGNOSIS   1. Left axillary sentinel node #1, excision:   One benign lymph node (0/1)   2. Left breast, mastectomy:   Invasive ductal carcinoma, partially mucinous, multifocal   Needle biopsy site with clip   Dermal and upper/ lower outer quadrant mammary scar   INVASIVE CARCINOMA OF THE BREAST: Resection   SPECIMEN   Procedure: Total mastectomy   Specimen Laterality: Left   TUMOR   Histologic Type:  Invasive carcinoma (ductal), partially mucinous   Glandular (Acinar) / Tubular Differentiation: Score 3   Nuclear Pleomorphism: Score 2   Mitotic Rate: Score 1   Overall Grade: Grade 2 (scores of 6 or 7)   Largest Tumor Size: 25 mm (retroareolar)   Tumor Focality: Multiple foci of invasive carcinoma   Number of Foci: 2   Sizes of Other Individual Foci (Millimeters): 5 mm (lower outer quadrant slide 2I)   Ductal Carcinoma In Situ (DCIS): Present   Architectural Patterns: Cribriform   Nuclear Grade: Grade II (intermediate)   Tumor Extent   Skin: Benign   Nipple DCIS: DCIS does not involve the nipple epidermis   Lymphovascular Invasion: Present   Dermal Lymphovascular Invasion: Not identified   Treatment Effect in the Breast: No known presurgical therapy for this recurrent tumor   MARGINS   Invasive Carcinoma Margins: Uninvolved by invasive carcinoma   Distance from Closest Margin (Millimeters): Greater than: 3 cm   Closest Margin(s): Inferior anterior   DCIS Margins: Uninvolved by DCIS   Distance from Closest Margin (Millimeters): Greater than: 3 cm   Closest Margin(s): Inferior anterior   LYMPH NODES   Regional Lymph Nodes: Uninvolved by tumor cells   Total Number of Lymph Nodes Examined: 1   Number of Stringer Nodes Examined: 1   BIOMARKER ANALYSIS   Performed on needle biopsy S    Estrogen receptor: Positive (99%)   Progesterone receptor: Positive (97%)   HER-2 IHC: Equivocal   HER-2 FISH: Negative   Ki-67: 10%   PATHOLOGIC STAGE CLASSIFICATION (pTNM, AJCC 8th Edition)   TNM Descriptors: m (multiple foci of invasive carcinoma), r (recurrent)   Primary Tumor (pT): pT2   Regional Lymph Nodes Modifier: (sn): Stringer node(s) evaluated   Regional Lymph Nodes (pN): pN0     ANDREAS Results (most recent):  Results from Hospital Encounter encounter on 09/01/22    ANDREAS 3D NISH W MAMMO RT SCREENING INCL CAD    Narrative  STUDY: Right unilateral digital screening mammogram with 3-D tomosynthesis    INDICATION:  Screening. Status post LEFT mastectomy March 2022    COMPARISON: Prior mammograms [id number]    BREAST COMPOSITION: The breasts are heterogeneously dense, which may obscure  small masses. FINDINGS: Patient is status post LEFT mastectomy. Right unilateral digital  screening mammography was performed and is interpreted in conjunction with a  computer assisted detection (CAD) system. Additionally, tomosynthesis of the  right breast in the CC and MLO projections was performed. No suspicious masses  or calcifications are identified. There has been no significant change. Impression  BI-RADS 1: Negative. No mammographic evidence of malignancy. RECOMMENDATIONS:  Next screening mammogram is recommended in one year. The patient will be notified of these results. Records reviewed and summarized above. Pathology report(s) reviewed above. Radiology report(s) reviewed above. Assessment:/PLAN     1) Stage IA (pT2, pN0(sn), cM0, G2, ER+, IL+, HER2- post Mastectomy 3/28/22  Reviewed prior hx of breast DCIS 1997 with Dr Rowena Nichols. No hormonal therapy done then. Patient is not interested in chemo. She did not see Rad/Onc as hx of radiation. She started adjuvant hormonal therapy with Anastrozole in 4/22. Patient seen today in office for follow up. She was last seen in 4/22 at first office visit. She remains on adjuvant hormonal therapy with Anastrozole. She is tolerating Anastrozole well overall and is clinically stable today. She continues to see Breast Surgery for follow up - next visit is in 4/23. She has routine HM and labs with her PCP. Continue Anastrozole. Follow up in July 2023. Patient agrees with plan. 2) Arthritis  Right hip replacement is scheduled for 12/8/22. Management per PCP/Ortho. 3) Psychosocial  Mood good, coping well. She is  and likes to garden. SW/NN support as needed.  is here today. Call if questions. Follow up in mid July 2023, seeing Breast Surgery in 4/23  I personally saw and evaluated the patient and performed the key components of medical decision making. The history, physical exam, and documentation were performed by Cyndee Jaramillo NP. I reviewed and verified the above documentation and modified it as needed. Pt doing well overall  Tolerating AI fine  RIGHT mammo good 9/22  Labs and routine HM with PCP  Continue adjuvant hormonal therapy for 5 years  Continue surveillance. I appreciate the opportunity to participate in Ms. Dasia Amaro's care.     Signed By: Alexander Cunningham DO

## 2022-11-29 NOTE — PROGRESS NOTES
Adonay Torres is a [de-identified] y.o. female  Chief Complaint   Patient presents with    Follow-up    Breast Cancer     1. Have you been to the ER, urgent care clinic since your last visit? Hospitalized since your last visit? No    2. Have you seen or consulted any other health care providers outside of the [address] [place] [address] since your last visit? Include any pap smears or colon screening. Yes, patient went to see her PCP  for fu,  (Ortho) for hip fu. Patient denied being in gown today due to already having an exam done already from another Provider?

## 2022-11-29 NOTE — LETTER
12/1/2022    Patient: Missy Hardys   YOB: 1940   Date of Visit: 11/29/2022     [place], [address] [address] [address]  Via Fax: [phone]     Rashad Powell MD  [address] [facility] [address]  [address]  Via In [place]    Dear MD Rashad Felix MD,      Thank you for referring Ms. Jeannette Guzman to [address] [place] [address] for evaluation. My notes for this consultation are attached. If you have questions, please do not hesitate to call me. I look forward to following your patient along with you.       Sincerely,    Marvin Brooke, DO

## 2023-04-20 ENCOUNTER — TRANSCRIBE ORDER (OUTPATIENT)
Dept: SCHEDULING | Age: 83
End: 2023-04-20

## 2023-04-20 DIAGNOSIS — Z12.31 SCREENING MAMMOGRAM FOR HIGH-RISK PATIENT: Primary | ICD-10-CM

## 2023-04-23 ENCOUNTER — TRANSCRIBE ORDERS (OUTPATIENT)
Facility: HOSPITAL | Age: 83
End: 2023-04-23

## 2023-04-23 DIAGNOSIS — Z12.31 SCREENING MAMMOGRAM FOR HIGH-RISK PATIENT: Primary | ICD-10-CM

## 2023-04-23 DIAGNOSIS — Z12.31 VISIT FOR SCREENING MAMMOGRAM: Primary | ICD-10-CM

## 2023-04-24 ENCOUNTER — DOCUMENTATION ONLY (OUTPATIENT)
Dept: SURGERY | Age: 83
End: 2023-04-24

## 2023-04-24 NOTE — PROGRESS NOTES
Faxed signed standard written order with office notes to [facility]. Confirmation received.

## 2023-05-09 DIAGNOSIS — C50.919 MALIGNANT NEOPLASM OF BREAST IN FEMALE, ESTROGEN RECEPTOR POSITIVE, UNSPECIFIED LATERALITY, UNSPECIFIED SITE OF BREAST (HCC): Primary | ICD-10-CM

## 2023-05-09 DIAGNOSIS — Z17.0 MALIGNANT NEOPLASM OF BREAST IN FEMALE, ESTROGEN RECEPTOR POSITIVE, UNSPECIFIED LATERALITY, UNSPECIFIED SITE OF BREAST (HCC): Primary | ICD-10-CM

## 2023-05-09 RX ORDER — ANASTROZOLE 1 MG/1
TABLET ORAL
Qty: 30 TABLET | Refills: 0 | Status: SHIPPED | OUTPATIENT
Start: 2023-05-09

## 2023-05-09 NOTE — TELEPHONE ENCOUNTER
Oral Hormone therapy     Jeyson Good is a  82 y. o.female  diagnosed with breast cancer . Ms. Ilan Hogue is being treated with anastrozole. Medication name: anastrozole    Dose:  20 mg   Frequency: daily    Ordering provider: Jane Monahan DO  Start date: 4/2022        Last OV 11/29/22  Next OV 7/12/2023    Refill sent to pharmacy on file.       Ha Retana, KODAK, BCOP, BCPS  For Pharmacy Admin Tracking Only    Program: Medical Group  CPA in place:  Yes  Recommendation Provided To: Patient/Caregiver: 1 via Telephone  Intervention Detail: Refill(s) Provided  Intervention Accepted By: Patient/Caregiver: 1    Time Spent (min): 10

## 2023-07-27 ENCOUNTER — TELEPHONE (OUTPATIENT)
Age: 83
End: 2023-07-27

## 2023-07-27 NOTE — TELEPHONE ENCOUNTER
Patient and spouse present to office today for appt with written appt card. Provider is not in office this afternoon, patient not on schedule. Spouse would like for patient to be seen today. Contacted Provider to update, notified practice manager, as well.

## 2023-07-31 ENCOUNTER — TELEPHONE (OUTPATIENT)
Age: 83
End: 2023-07-31

## 2023-08-01 ENCOUNTER — TELEPHONE (OUTPATIENT)
Age: 83
End: 2023-08-01

## 2023-08-01 NOTE — TELEPHONE ENCOUNTER
LVM that I had heard about their experience her at the office. I was away on PTO but wanted to talk with them about the confusion on the appt. Did leave in message that we did have a major computer upgraded and it could be just human error. Explain that we were happy they reschedule appt and we look forward to seeing them them. If they would like to discuss the event of what happen on that day, that I was happy to speak with them.   Left my phone number on the answering matching and also fill out a safe care event # [id number]

## 2023-08-01 NOTE — TELEPHONE ENCOUNTER
----- Message from Herlinda Moreno DO sent at 7/27/2023  3:48 PM EDT -----  Regarding: RE: Patient Alejandrina Hahn for making another appt  Dont know what to say about this  Admin can apologize to pt    ----- Message -----  From: Ondina Brenner  Sent: 7/27/2023   3:47 PM EDT  To: KARIE Thompson NP, #  Subject: Patient Upset                                    Patient came in upset. She had a appointment card with a appointment for today \"not sure what happened\" Had to do research in the old [employer]. . But no appointment in the system for today. Patient demanded to speak to Dr Niyah Dubois as she is not in office. In the mist of reaching out to the covering manager patient left the building. I was able to get her rescheduled.

## 2023-08-14 NOTE — PROGRESS NOTES
[facility] at [address] , [address]  W: [phone]  F: [phone]    Reason for Visit:   Adeola Redmond is a 82 y.o. 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 RI 97%, Her 2 2+, Ki 10% FISH negative    3/28/22 - LEFT Breast Mastectomy and SLN Biopsy: PATH - T2 N0 multicentric disease     Adjuvant hormonal therapy with Anastrozole 4/22 - Current       STAGE pT2pN0 multicentric     History of Present Illness:     Pt seen today for office fu breast cancer. On adjuvant AI. C/o hair loss and very upset about this. Was taking AI every other day. Tolerating AI otherwise. No fevers/ chills/ chest pain/ SOB/ nausea/ vomiting/diarrhea/ pain/fatigue        Past Medical History:   Diagnosis Date    Arthritis     back, neck, hands    Breast cancer ([address]) 1997    Left    Breast cancer ([address]) 02/15/2022    Left Mucinous Carcinoma    Chronic pain     fibromyalgia - neck/hands    Fibromyalgia     History of breast cancer 1997    Left breast cancer treated with surgery and radiation. Hyperlipemia     Hypertension     Other ill-defined conditions(799.89)     IBD - no problem with this at this time    S/P radiation therapy 1997    Vaginal delivery     Four times. Four episiotomies. Past Surgical History:   Procedure Laterality Date    APPENDECTOMY      BREAST BIOPSY Left 2005    stereo  benign    BREAST BIOPSY Left 02/15/2022    US guided Mucinous Carcinoma    BREAST LUMPECTOMY Left 1997    Ca.     BREAST SURGERY Left 1997    L lumpectomy x2    COLONOSCOPY N/A 6/15/2016    COLONOSCOPY performed by Willy Castellon MD at [facility] ENDOSCOPY    COLONOSCOPY N/A 3/8/2022    COLONOSCOPY   :- performed by Akshat Munoz MD at [facility] ENDOSCOPY    GI      colonoscopies - last 2022    GYN  1989    hysterectomy    HEENT

## 2023-08-15 ENCOUNTER — OFFICE VISIT (OUTPATIENT)
Age: 83
End: 2023-08-15
Payer: MEDICARE

## 2023-08-15 VITALS
HEART RATE: 97 BPM | OXYGEN SATURATION: 96 % | SYSTOLIC BLOOD PRESSURE: 133 MMHG | TEMPERATURE: 98.3 F | WEIGHT: 113 LBS | RESPIRATION RATE: 16 BRPM | BODY MASS INDEX: 20.02 KG/M2 | DIASTOLIC BLOOD PRESSURE: 75 MMHG

## 2023-08-15 DIAGNOSIS — C50.912 RECURRENT BREAST CANCER, LEFT (HCC): Primary | ICD-10-CM

## 2023-08-15 DIAGNOSIS — Z90.12 HISTORY OF LEFT MASTECTOMY: ICD-10-CM

## 2023-08-15 DIAGNOSIS — Z79.811 USE OF ANASTROZOLE (ARIMIDEX): ICD-10-CM

## 2023-08-15 DIAGNOSIS — M19.90 ARTHRITIS: ICD-10-CM

## 2023-08-15 PROCEDURE — G8420 CALC BMI NORM PARAMETERS: HCPCS | Performed by: INTERNAL MEDICINE

## 2023-08-15 PROCEDURE — 99213 OFFICE O/P EST LOW 20 MIN: CPT | Performed by: INTERNAL MEDICINE

## 2023-08-15 PROCEDURE — G8427 DOCREV CUR MEDS BY ELIG CLIN: HCPCS | Performed by: INTERNAL MEDICINE

## 2023-08-15 PROCEDURE — 1090F PRES/ABSN URINE INCON ASSESS: CPT | Performed by: INTERNAL MEDICINE

## 2023-08-15 PROCEDURE — G8400 PT W/DXA NO RESULTS DOC: HCPCS | Performed by: INTERNAL MEDICINE

## 2023-08-15 PROCEDURE — 4004F PT TOBACCO SCREEN RCVD TLK: CPT | Performed by: INTERNAL MEDICINE

## 2023-08-15 PROCEDURE — 1123F ACP DISCUSS/DSCN MKR DOCD: CPT | Performed by: INTERNAL MEDICINE

## 2023-08-15 NOTE — PROGRESS NOTES
Jennyfer Rodrigues is a 82 y.o. female    Chief Complaint   Patient presents with    Follow-up     breast cancer       1. Have you been to the ER, urgent care clinic since your last visit? Hospitalized since your last visit? No    2. Have you seen or consulted any other health care providers outside of the [address] [place] [address] since your last visit? Include any pap smears or colon screening.  Yes, Scott Diaz

## 2023-09-05 ENCOUNTER — HOSPITAL ENCOUNTER (OUTPATIENT)
Facility: HOSPITAL | Age: 83
Discharge: HOME | End: 2023-09-08
Payer: MEDICARE

## 2023-09-05 ENCOUNTER — TRANSCRIBE ORDERS (OUTPATIENT)
Facility: HOSPITAL | Age: 83
End: 2023-09-05

## 2023-09-05 VITALS — HEIGHT: 65 IN | BODY MASS INDEX: 19.66 KG/M2 | WEIGHT: 118 LBS

## 2023-09-05 DIAGNOSIS — Z12.31 VISIT FOR SCREENING MAMMOGRAM: Primary | ICD-10-CM

## 2023-09-05 DIAGNOSIS — Z12.31 VISIT FOR SCREENING MAMMOGRAM: ICD-10-CM

## 2023-09-05 PROCEDURE — 77063 BREAST TOMOSYNTHESIS BI: CPT

## 2024-02-13 ENCOUNTER — OFFICE VISIT (OUTPATIENT)
Age: 84
End: 2024-02-13
Payer: MEDICARE

## 2024-02-13 VITALS
OXYGEN SATURATION: 96 % | TEMPERATURE: 98.9 F | RESPIRATION RATE: 18 BRPM | HEART RATE: 88 BPM | SYSTOLIC BLOOD PRESSURE: 122 MMHG | BODY MASS INDEX: 19.47 KG/M2 | WEIGHT: 117 LBS | DIASTOLIC BLOOD PRESSURE: 79 MMHG

## 2024-02-13 DIAGNOSIS — M19.90 ARTHRITIS: ICD-10-CM

## 2024-02-13 DIAGNOSIS — Z90.12 HISTORY OF LEFT MASTECTOMY: ICD-10-CM

## 2024-02-13 DIAGNOSIS — Z85.3 HISTORY OF LEFT BREAST CANCER: Primary | ICD-10-CM

## 2024-02-13 PROBLEM — Z79.811 USE OF ANASTROZOLE (ARIMIDEX): Status: RESOLVED | Noted: 2022-11-29 | Resolved: 2024-02-13

## 2024-02-13 PROCEDURE — G8420 CALC BMI NORM PARAMETERS: HCPCS | Performed by: NURSE PRACTITIONER

## 2024-02-13 PROCEDURE — 99212 OFFICE O/P EST SF 10 MIN: CPT | Performed by: NURSE PRACTITIONER

## 2024-02-13 PROCEDURE — G8484 FLU IMMUNIZE NO ADMIN: HCPCS | Performed by: NURSE PRACTITIONER

## 2024-02-13 PROCEDURE — 1123F ACP DISCUSS/DSCN MKR DOCD: CPT | Performed by: NURSE PRACTITIONER

## 2024-02-13 PROCEDURE — 1090F PRES/ABSN URINE INCON ASSESS: CPT | Performed by: NURSE PRACTITIONER

## 2024-02-13 PROCEDURE — G8400 PT W/DXA NO RESULTS DOC: HCPCS | Performed by: NURSE PRACTITIONER

## 2024-02-13 PROCEDURE — G8427 DOCREV CUR MEDS BY ELIG CLIN: HCPCS | Performed by: NURSE PRACTITIONER

## 2024-02-13 PROCEDURE — 1036F TOBACCO NON-USER: CPT | Performed by: NURSE PRACTITIONER

## 2024-02-13 NOTE — PROGRESS NOTES
Chrissy Tierney is a 84 y.o. female    Chief Complaint   Patient presents with    Follow-up     Breast Cancer       1. Have you been to the ER, urgent care clinic since your last visit?  Hospitalized since your last visit?No    2. Have you seen or consulted any other health care providers outside of the [facility] since your last visit?  Include any pap smears or colon screening. No    
  Management per PCP/Ortho.     3) Psychosocial  Mood good, coping well.   She is  and likes to garden.  SW/NN support as needed    Call if questions  Follow up in 6 months.  I appreciate the opportunity to participate in Ms. Chrissy Tierney's care.    Signed By: KARIE Kaye - NP

## 2024-06-11 ENCOUNTER — HOSPITAL ENCOUNTER (OUTPATIENT)
Facility: HOSPITAL | Age: 84
Setting detail: RECURRING SERIES
Discharge: HOME | End: 2024-06-14
Payer: MEDICARE

## 2024-06-11 PROCEDURE — 97161 PT EVAL LOW COMPLEX 20 MIN: CPT | Performed by: PHYSICAL THERAPIST

## 2024-06-11 PROCEDURE — 97110 THERAPEUTIC EXERCISES: CPT | Performed by: PHYSICAL THERAPIST

## 2024-06-11 NOTE — THERAPY EVALUATION
Prior to 3-4 mos no history of back pain   Limitations to PLOF: none   Mechanism of Injury: none  Pain Location: L buttock, posterior L thigh   Pain Description: ache   Aggravating Factors: sit to stand transfers, walking, sleeping on L side   Relieving factors: sitting   Current Functional Limitations: pain and difficulty completing her stretching exercise routine that she has completed for the past 20 years   Previous Treatment/Compliance: medication   PMHx/Surgical Hx: L MONICA   Work Hx: retired   Living Situation: lives with    Patient Goals: \"to get back like I was.\"   Barriers: none   Motivation: good                OBJECTIVE    Observation: decreased lumbar spine segmental motion with lumbar AROM testing    Squat test: leads with lumbar flexion, squats to ground     ROM:     Lumbar ROM:   Flexion fingertips to floor   Extension decreased 25% feel it in low back   R SB decreased 25% painful   L SB decreased 25% painful     Hip PROM: B WFL    Strength:     R Hip flexion 5/5  R Knee extension 5/5  R Knee flexion 5/5  R Ankle DF 5/5  R hip abduction 4+/5    L Hip flexion 4-/5  L Knee extension 5/5  L Knee flexion 5/5  L Ankle DF 5/5  L hip abduction 3-/5     Palpation: L sacral border tender       Objective/Functional Outcome Measure:   FOTO Score: 62  FOTO score = an established functional score where 100 = no disability      20 min [x]Eval - untimed                        Therapeutic Procedures:  Tx Min Billable or 1:1 Min (if diff from Tx Min) Procedure, Rationale, Specifics   25  [id number] Therapeutic Exercise (timed):  increase ROM, strength, coordination, balance, and proprioception to improve patient's ability to progress to PLOF and address remaining functional goals. (see flow sheet as applicable)    Details if applicable:                         25     Total Total         [x]  Patient Education billed concurrently with other procedures   [x] Review HEP    [] Progressed/Changed HEP, detail:    [x] Other

## 2024-06-18 ENCOUNTER — HOSPITAL ENCOUNTER (OUTPATIENT)
Facility: HOSPITAL | Age: 84
Setting detail: RECURRING SERIES
Discharge: HOME | End: 2024-06-21
Payer: MEDICARE

## 2024-06-18 PROCEDURE — 97110 THERAPEUTIC EXERCISES: CPT | Performed by: PHYSICAL THERAPIST

## 2024-06-18 NOTE — PROGRESS NOTES
PHYSICAL THERAPY - MEDICARE DAILY TREATMENT NOTE (updated 3/23)      Date: 2024          Patient Name:  Chrissy Tierney :  1940   Medical   Diagnosis:  Low back pain [M54.50] Treatment Diagnosis:  M54.42  LUMBAGO WITH SCIATICA, LEFT SIDE    Referral Source:  Roberto Carlos Licona MD Insurance:   Payor: MEDICARE / Plan: MEDICARE PART A AND B / Product Type: *No Product type* /                     Patient  verified yes     Visit #   Current  / Total 2 24   Time   In / Out 950 AM 1030 AM   Total Treatment Time 40   Total Timed Codes 40   1:1 Treatment Time 40      [facility] Totals Reminder:  bill using total billable   min of TIMED therapeutic procedures and modalities.   8-22 min = 1 unit; 23-37 min = 2 units; 38-52 min = 3 units; 53-67 min = 4 units; 68-82 min = 5 units            SUBJECTIVE    Pain Level (0-10 scale): 0    Any medication changes, allergies to medications, adverse drug reactions, diagnosis change, or new procedure performed?: [x] No    [] Yes (see summary sheet for update)  Medications: Verified on Patient Summary List    Subjective functional status/changes:     Patient reports leg is feeling better. She has been working on HEP.     OBJECTIVE      Therapeutic Procedures:  Tx Min Billable or 1:1 Min (if diff from Tx Min) Procedure, Rationale, Specifics   40  [id number] Therapeutic Exercise (timed):  increase ROM, strength, coordination, balance, and proprioception to improve patient's ability to progress to PLOF and address remaining functional goals. (see flow sheet as applicable)     Details if applicable:                         40     Total Total             [x]  Patient Education billed concurrently with other procedures   [x] Review HEP    [] Progressed/Changed HEP, detail:    [] Other detail:         Other Objective/Functional Measures  NT    Pain Level at end of session (0-10 scale): 0       Assessment   Patient tolerated exercise progression well.   Patient will continue to benefit from

## 2024-06-20 ENCOUNTER — HOSPITAL ENCOUNTER (OUTPATIENT)
Facility: HOSPITAL | Age: 84
Setting detail: RECURRING SERIES
Discharge: HOME | End: 2024-06-23
Payer: MEDICARE

## 2024-06-20 PROCEDURE — 97110 THERAPEUTIC EXERCISES: CPT | Performed by: PHYSICAL THERAPIST

## 2024-06-20 NOTE — PROGRESS NOTES
PHYSICAL THERAPY - MEDICARE DAILY TREATMENT NOTE (updated 3/23)      Date: 2024          Patient Name:  Chrissy Tierney :  1940   Medical   Diagnosis:  Low back pain [M54.50] Treatment Diagnosis:  M54.42  LUMBAGO WITH SCIATICA, LEFT SIDE    Referral Source:  Roberto Carlos Licona MD Insurance:   Payor: MEDICARE / Plan: MEDICARE PART A AND B / Product Type: *No Product type* /                     Patient  verified yes     Visit #   Current  / Total 2 24   Time   In / Out 1250   PM   Total Treatment Time 40   Total Timed Codes 40   1:1 Treatment Time 25      [facility] Totals Reminder:  bill using total billable   min of TIMED therapeutic procedures and modalities.   8-22 min = 1 unit; 23-37 min = 2 units; 38-52 min = 3 units; 53-67 min = 4 units; 68-82 min = 5 units            SUBJECTIVE    Pain Level (0-10 scale): 0    Any medication changes, allergies to medications, adverse drug reactions, diagnosis change, or new procedure performed?: [x] No    [] Yes (see summary sheet for update)  Medications: Verified on Patient Summary List    Subjective functional status/changes:     Patient reports leg continues to be doing well. She picked vegetables in her garden.     OBJECTIVE      Therapeutic Procedures:  Tx Min Billable or 1:1 Min (if diff from Tx Min) Procedure, Rationale, Specifics   40 25 [id number] Therapeutic Exercise (timed):  increase ROM, strength, coordination, balance, and proprioception to improve patient's ability to progress to PLOF and address remaining functional goals. (see flow sheet as applicable)     Details if applicable:                         40 25    Total Total             [x]  Patient Education billed concurrently with other procedures   [x] Review HEP    [] Progressed/Changed HEP, detail:    [] Other detail:         Other Objective/Functional Measures  NT    Pain Level at end of session (0-10 scale): 0       Assessment   Patient tolerated treatment well today. Progressing with pain

## 2024-06-27 ENCOUNTER — HOSPITAL ENCOUNTER (OUTPATIENT)
Facility: HOSPITAL | Age: 84
Setting detail: RECURRING SERIES
Discharge: HOME | End: 2024-06-30
Payer: MEDICARE

## 2024-06-27 PROCEDURE — 97110 THERAPEUTIC EXERCISES: CPT | Performed by: PHYSICAL THERAPIST

## 2024-06-27 NOTE — PROGRESS NOTES
PHYSICAL THERAPY - MEDICARE DAILY TREATMENT NOTE (updated 3/23)      Date: 2024          Patient Name:  Chrissy Tierney :  1940   Medical   Diagnosis:  Low back pain [M54.50] Treatment Diagnosis:  M54.42  LUMBAGO WITH SCIATICA, LEFT SIDE    Referral Source:  Roberto Carlos Licona MD Insurance:   Payor: MEDICARE / Plan: MEDICARE PART A AND B / Product Type: *No Product type* /                     Patient  verified yes     Visit #   Current  / Total 3 24   Time   In / Out 920  AM 1000 AM   Total Treatment Time 40   Total Timed Codes 40   1:1 Treatment Time 40      [facility] Totals Reminder:  bill using total billable   min of TIMED therapeutic procedures and modalities.   8-22 min = 1 unit; 23-37 min = 2 units; 38-52 min = 3 units; 53-67 min = 4 units; 68-82 min = 5 units            SUBJECTIVE    Pain Level (0-10 scale): 0    Any medication changes, allergies to medications, adverse drug reactions, diagnosis change, or new procedure performed?: [x] No    [] Yes (see summary sheet for update)  Medications: Verified on Patient Summary List    Subjective functional status/changes:     Patient reports that she is doing so much better. She still has low back pain when she stands up after prolonged sitting.     OBJECTIVE      Therapeutic Procedures:  Tx Min Billable or 1:1 Min (if diff from Tx Min) Procedure, Rationale, Specifics   40  [id number] Therapeutic Exercise (timed):  increase ROM, strength, coordination, balance, and proprioception to improve patient's ability to progress to PLOF and address remaining functional goals. (see flow sheet as applicable)     Details if applicable:                         40     Total Total             [x]  Patient Education billed concurrently with other procedures   [x] Review HEP    [] Progressed/Changed HEP, detail:    [] Other detail:         Other Objective/Functional Measures  NT    Pain Level at end of session (0-10 scale): 0       Assessment   Excellent HEP compliance.  None known

## 2024-08-12 NOTE — PROGRESS NOTES
[facility] [place] [facility]  [address], [address] [place] [address]  W: [phone]  F: [phone]    Reason for Visit:   Chrissy Tierney is a 84 y.o. female seen today in office for follow up of Left Breast Cancer.    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 AL 97%, Her2 2+, Ki-67 10% FISH negative  3/28/22 - LEFT Breast Mastectomy and SLN Biopsy: PATH - T2 N0 multicentric disease   Adjuvant hormonal therapy with Anastrozole 4/22 - 8/23 stopped due to hair loss     STAGE pT2pN0 multicentric     History of Present Illness:   Chrissy Tierney is a 84 y.o. female seen today in [facility] for 6 month follow up of up ER+ Her2 Negative Left Breast Cancer with Hx of Left Mastectomy. She is not on adjuvant hormonal therapy by choice due to hair loss. She reports that she feels well overall today - nothing new or different.  Her appetite and energy levels are good. She remains active, enjoys gardening. She denies fever, chills, mouth sores, cough, SOB, CP, nausea, vomiting, diarrhea, and constipation. She denies pain today. She is here alone today.     Past Medical History:   Diagnosis Date    Arthritis     back, neck, hands    Breast cancer (HCC) 1997    Left    Breast cancer (HCC) 02/15/2022    Left Mucinous Carcinoma    Chronic pain     fibromyalgia - neck/hands    Fibromyalgia     History of breast cancer 1997    Left breast cancer treated with surgery and radiation.    History of therapeutic radiation     1997 following left breast lumpectomy    Hyperlipemia     Hypertension     Other ill-defined conditions(599.77)     IBD - no problem with this at this time    S/P radiation therapy 1997    Vaginal delivery     Four times.  Four episiotomies.      Past Surgical History:   Procedure Laterality Date    APPENDECTOMY      BREAST BIOPSY Left 2005    stereo  benign    BREAST

## 2024-08-13 ENCOUNTER — OFFICE VISIT (OUTPATIENT)
Age: 84
End: 2024-08-13
Payer: MEDICARE

## 2024-08-13 VITALS
BODY MASS INDEX: 19.3 KG/M2 | TEMPERATURE: 98.6 F | RESPIRATION RATE: 18 BRPM | SYSTOLIC BLOOD PRESSURE: 150 MMHG | DIASTOLIC BLOOD PRESSURE: 85 MMHG | HEART RATE: 99 BPM | WEIGHT: 116 LBS | OXYGEN SATURATION: 95 %

## 2024-08-13 DIAGNOSIS — M19.90 ARTHRITIS: ICD-10-CM

## 2024-08-13 DIAGNOSIS — Z90.12 HISTORY OF LEFT MASTECTOMY: ICD-10-CM

## 2024-08-13 DIAGNOSIS — Z85.3 HISTORY OF LEFT BREAST CANCER: Primary | ICD-10-CM

## 2024-08-13 PROCEDURE — 1123F ACP DISCUSS/DSCN MKR DOCD: CPT | Performed by: NURSE PRACTITIONER

## 2024-08-13 PROCEDURE — G8420 CALC BMI NORM PARAMETERS: HCPCS | Performed by: NURSE PRACTITIONER

## 2024-08-13 PROCEDURE — 99213 OFFICE O/P EST LOW 20 MIN: CPT | Performed by: NURSE PRACTITIONER

## 2024-08-13 PROCEDURE — 1036F TOBACCO NON-USER: CPT | Performed by: NURSE PRACTITIONER

## 2024-08-13 PROCEDURE — G8428 CUR MEDS NOT DOCUMENT: HCPCS | Performed by: NURSE PRACTITIONER

## 2024-08-13 PROCEDURE — G8400 PT W/DXA NO RESULTS DOC: HCPCS | Performed by: NURSE PRACTITIONER

## 2024-08-13 PROCEDURE — 1090F PRES/ABSN URINE INCON ASSESS: CPT | Performed by: NURSE PRACTITIONER

## 2024-08-13 RX ORDER — ACETAMINOPHEN 325 MG/1
650 TABLET ORAL EVERY 6 HOURS PRN
COMMUNITY

## 2024-08-13 NOTE — PROGRESS NOTES
Chrissy MARIN Tierney is a 84 y.o. female    Chief Complaint   Patient presents with    Follow-up      Left Breast Cancer       1. Have you been to the ER, urgent care clinic since your last visit?  Hospitalized since your last visit?No    2. Have you seen or consulted any other health care providers outside of the [facility] since your last visit?  Include any pap smears or colon screening. No

## 2024-09-10 ENCOUNTER — HOSPITAL ENCOUNTER (OUTPATIENT)
Facility: HOSPITAL | Age: 84
Discharge: HOME | End: 2024-09-13
Payer: MEDICARE

## 2024-09-10 VITALS — HEIGHT: 65 IN | BODY MASS INDEX: 19.33 KG/M2 | WEIGHT: 116 LBS

## 2024-09-10 DIAGNOSIS — Z12.31 VISIT FOR SCREENING MAMMOGRAM: ICD-10-CM

## 2024-09-10 PROCEDURE — 77063 BREAST TOMOSYNTHESIS BI: CPT

## 2024-10-08 ENCOUNTER — HOSPITAL ENCOUNTER (OUTPATIENT)
Facility: HOSPITAL | Age: 84
Setting detail: RECURRING SERIES
Discharge: HOME | End: 2024-10-11
Payer: MEDICARE

## 2024-10-08 PROCEDURE — 97162 PT EVAL MOD COMPLEX 30 MIN: CPT | Performed by: PHYSICAL THERAPIST

## 2024-10-08 PROCEDURE — 97110 THERAPEUTIC EXERCISES: CPT | Performed by: PHYSICAL THERAPIST

## 2024-10-08 NOTE — THERAPY EVALUATION
Physical Therapy at [facility],   a part of [facility]  [address] [address]  Phone:[phone] Fax:[phone]                                                                            PHYSICAL THERAPY - MEDICARE EVALUATION/PLAN OF CARE NOTE (updated 3/23)      Date: 10/8/2024          Patient Name:  Chrissy Tierney :  1940   Medical   Diagnosis:  Back pain [M54.9] Treatment Diagnosis:  M54.59  OTHER LOWER BACK PAIN    Referral Source:  Roberto Carlos Licona MD Provider #:  [id number]                  Insurance: Payor: MEDICARE / Plan: MEDICARE PART A AND B / Product Type: *No Product type* /      Patient  verified yes     Visit #   Current  / Total 1 MN   Time   In / Out 1132 AM 1218 PM   Total Treatment Time 46   Total Timed Codes 10   1:1 Treatment Time 10      [facility] Totals Reminder:  bill using total billable   min of TIMED therapeutic procedures and modalities.   8-22 min = 1 unit; 23-37 min = 2 units; 38-52 min = 3 units;  53-67 min = 4 units; 68-82 min = 5 units           SUBJECTIVE  Pain Level (0-10 scale): 4/10  []constant []intermittent []improving []worsening []no change since onset    Any medication changes, allergies to medications, adverse drug reactions, diagnosis change, or new procedure performed?: [x] No    [] Yes (see summary sheet for update)  Medications: Verified on Patient Summary List    Subjective functional status/changes:     Pt underwent L3-L5 left edmund-laminectomy, facetectomy and TLIF fusion on 10/1/24. Notes that her radicular symptoms have resolved, but continues to have pain in her back. She is wearing brace full time except when sleeping or bathing. She is following post-op precautions.     Start of Care: 10/8/2024  Onset Date: 10/1/24  Current symptoms/Complaints: low back pain  Mechanism of Injury: indisious  PLOF: gardening, housework, shopping. Ambulates independently  Limitations to PLOF/Activity or Recreational

## 2024-10-10 ENCOUNTER — HOSPITAL ENCOUNTER (OUTPATIENT)
Facility: HOSPITAL | Age: 84
Setting detail: RECURRING SERIES
Discharge: HOME | End: 2024-10-13
Payer: MEDICARE

## 2024-10-10 PROCEDURE — 97112 NEUROMUSCULAR REEDUCATION: CPT | Performed by: PHYSICAL THERAPIST

## 2024-10-10 PROCEDURE — 97110 THERAPEUTIC EXERCISES: CPT | Performed by: PHYSICAL THERAPIST

## 2024-10-10 NOTE — PROGRESS NOTES
PHYSICAL THERAPY - MEDICARE DAILY TREATMENT NOTE (updated 3/23)      Date: 10/10/2024          Patient Name:  Chrissy Tierney :  1940   Medical   Diagnosis:  Back pain [M54.9] Treatment Diagnosis:  M54.59  OTHER LOWER BACK PAIN    Referral Source:  Roberto Carlos Licona MD Insurance:   Payor: MEDICARE / Plan: MEDICARE PART A AND B / Product Type: *No Product type* /                     Patient  verified yes     Visit #   Current  / Total 2 MN   Time   In / Out 102  PM   Total Treatment Time 38   Total Timed Codes 28   1:1 Treatment Time 28      [facility] Totals Reminder:  bill using total billable   min of TIMED therapeutic procedures and modalities.   8-22 min = 1 unit; 23-37 min = 2 units; 38-52 min = 3 units; 53-67 min = 4 units; 68-82 min = 5 units            SUBJECTIVE    Pain Level (0-10 scale): 3-4/10    Any medication changes, allergies to medications, adverse drug reactions, diagnosis change, or new procedure performed?: [x] No    [] Yes (see summary sheet for update)  Medications: Verified on Patient Summary List    Subjective functional status/changes:     Pt notes that she is having a little more pain in L side low back and unsure why. She has been wearing brace all the time.     OBJECTIVE      Therapeutic Procedures:  Tx Min Billable or 1:1 Min (if diff from Tx Min) Procedure, Rationale, Specifics   10  66647 Neuromuscular Re-Education (timed):  improve balance, coordination, kinesthetic sense, posture, core stability and proprioception to improve patient's ability to develop conscious control of individual muscles and awareness of position of extremities in order to progress to PLOF and address remaining functional goals. (see flow sheet as applicable)     Details if applicable:  TA, glute set   18  97411 Therapeutic Exercise (timed):  increase ROM, strength, coordination, balance, and proprioception to improve patient's ability to progress to PLOF and address remaining functional goals. (see

## 2024-10-14 ENCOUNTER — HOSPITAL ENCOUNTER (OUTPATIENT)
Facility: HOSPITAL | Age: 84
Setting detail: RECURRING SERIES
Discharge: HOME | End: 2024-10-17
Payer: MEDICARE

## 2024-10-14 PROCEDURE — 97110 THERAPEUTIC EXERCISES: CPT | Performed by: PHYSICAL THERAPIST

## 2024-10-14 PROCEDURE — 97112 NEUROMUSCULAR REEDUCATION: CPT | Performed by: PHYSICAL THERAPIST

## 2024-10-14 NOTE — PROGRESS NOTES
goals.    Progress toward goals / Updated goals:  []  See Progress Note/Recertification    NT      PLAN  Yes  Continue plan of care  Re-Cert Due: 1/7/24  []  Upgrade activities as tolerated  []  Discharge due to:  []  Other:      Lizzeth Espitia, PT       10/14/2024       3:14 PM

## 2024-10-17 ENCOUNTER — HOSPITAL ENCOUNTER (OUTPATIENT)
Facility: HOSPITAL | Age: 84
Setting detail: RECURRING SERIES
Discharge: HOME | End: 2024-10-20
Payer: MEDICARE

## 2024-10-17 PROCEDURE — 97112 NEUROMUSCULAR REEDUCATION: CPT | Performed by: PHYSICAL THERAPIST

## 2024-10-17 PROCEDURE — 97110 THERAPEUTIC EXERCISES: CPT | Performed by: PHYSICAL THERAPIST

## 2024-10-17 NOTE — PROGRESS NOTES
Rationale:     decrease inflammation and decrease pain to improve patient's ability to progress to PLOF and address remaining functional goals.       min [] Estim Unattended,             type/location:       []  w/ice    []  w/heat        min [] Estim Attended,             type/location:       []  w/ice   []  w/heat         []  w/US   []  TENS insruct            min []  Mechanical Traction,        type/lbs:        []  pro      []  sup           []  int       []  cont            []  before manual           []  after manual     min []  Ultrasound,         settings/location:     10 min  unbilled [x]  Ice     []  Heat            location/position: low back/supine         min []  Vasopneumatic Device,      press/temp:   pre-treatment girth :    post-treatment girth :    measured at (landmark       location) :   If using vaso (only need to measure limb vaso being performed on)        min []  Other:      Skin assessment post-treatment (if applicable):    [x]  intact    []  redness- no adverse reaction                 []redness - adverse reaction:          [x]  Patient Education billed concurrently with other procedures   [x] Review HEP    [] Progressed/Changed HEP, detail:    [] Other detail:         Other Objective/Functional Measures  Tandem stance 20 sec bi    Pain Level at end of session (0-10 scale): 0/10      Assessment   Pt participated well with skilled PT services continuing to require cueing for proper form for log roll to reduce pain with sit<>supine transfers.   Patient will continue to benefit from skilled PT / OT services to modify and progress therapeutic interventions, analyze and address functional mobility deficits, analyze and address ROM deficits, analyze and address strength deficits, analyze and address soft tissue restrictions, analyze and cue for proper movement patterns, analyze and modify for postural abnormalities, and analyze and address imbalance/dizziness to address functional deficits and

## 2024-10-23 ENCOUNTER — HOSPITAL ENCOUNTER (OUTPATIENT)
Facility: HOSPITAL | Age: 84
Setting detail: RECURRING SERIES
Discharge: HOME | End: 2024-10-26
Payer: MEDICARE

## 2024-10-23 PROCEDURE — 97112 NEUROMUSCULAR REEDUCATION: CPT | Performed by: PHYSICAL THERAPIST

## 2024-10-23 PROCEDURE — 97110 THERAPEUTIC EXERCISES: CPT | Performed by: PHYSICAL THERAPIST

## 2024-10-23 NOTE — PROGRESS NOTES
PHYSICAL THERAPY - MEDICARE DAILY TREATMENT NOTE (updated 3/23)      Date: 10/23/2024          Patient Name:  Chrissy Tierney :  1940   Medical   Diagnosis:  Back pain [M54.9] Treatment Diagnosis:  M54.59  OTHER LOWER BACK PAIN    Referral Source:  Roberto Carlos Licona MD Insurance:   Payor: MEDICARE / Plan: MEDICARE PART A AND B / Product Type: *No Product type* /                     Patient  verified yes     Visit #   Current  / Total 5 MN   Time   In / Out 1252  PM   Total Treatment Time 46   Total Timed Codes 46   1:1 Treatment Time 46      [facility] Totals Reminder:  bill using total billable   min of TIMED therapeutic procedures and modalities.   8-22 min = 1 unit; 23-37 min = 2 units; 38-52 min = 3 units; 53-67 min = 4 units; 68-82 min = 5 units            SUBJECTIVE    Pain Level (0-10 scale): /10    Any medication changes, allergies to medications, adverse drug reactions, diagnosis change, or new procedure performed?: [x] No    [] Yes (see summary sheet for update)  Medications: Verified on Patient Summary List    Subjective functional status/changes:     Pt reports she went back to [place] and no longer needs to wear her brace, but does have to continue with bending, twisting, and lifting restrictions. States she's not having much pain, but can feel in her low back where \"it just isn't healed yet\".     OBJECTIVE      Therapeutic Procedures:  Tx Min Billable or 1:1 Min (if diff from Tx Min) Procedure, Rationale, Specifics   12  80863 Neuromuscular Re-Education (timed):  improve balance, coordination, kinesthetic sense, posture, core stability and proprioception to improve patient's ability to develop conscious control of individual muscles and awareness of position of extremities in order to progress to PLOF and address remaining functional goals. (see flow sheet as applicable)     Details if applicable:  TA, glute set   34  31978 Therapeutic Exercise (timed):  increase ROM, strength, coordination,

## 2024-10-25 ENCOUNTER — HOSPITAL ENCOUNTER (OUTPATIENT)
Facility: HOSPITAL | Age: 84
Setting detail: RECURRING SERIES
Discharge: HOME | End: 2024-10-28
Payer: MEDICARE

## 2024-10-25 PROCEDURE — 97110 THERAPEUTIC EXERCISES: CPT

## 2024-10-25 PROCEDURE — 97112 NEUROMUSCULAR REEDUCATION: CPT

## 2024-10-25 NOTE — PROGRESS NOTES
PHYSICAL THERAPY - MEDICARE DAILY TREATMENT NOTE (updated 3/23)      Date: 10/25/2024          Patient Name:  Chrissy Tierney :  1940   Medical   Diagnosis:  Back pain [M54.9] Treatment Diagnosis:  M54.59  OTHER LOWER BACK PAIN    Referral Source:  Roberto Carlos Licona MD Insurance:   Payor: MEDICARE / Plan: MEDICARE PART A AND B / Product Type: *No Product type* /                     Patient  verified yes     Visit #   Current  / Total 6 MN   Time   In / Out 11:00 AM 11:45 AM   Total Treatment Time 45   Total Timed Codes 45   1:1 Treatment Time 25      [facility] Totals Reminder:  bill using total billable   min of TIMED therapeutic procedures and modalities.   8-22 min = 1 unit; 23-37 min = 2 units; 38-52 min = 3 units; 53-67 min = 4 units; 68-82 min = 5 units            SUBJECTIVE    Pain Level (0-10 scale): 1/10 \"ache\"    Any medication changes, allergies to medications, adverse drug reactions, diagnosis change, or new procedure performed?: [x] No    [] Yes (see summary sheet for update)  Medications: Verified on Patient Summary List    Subjective functional status/changes:     Pt reports she is performing low level household chores (vacuuming, making her bed etc) states she is unable to perform stretches due to BLT restrictions and would like to get back to them when able.     OBJECTIVE      Therapeutic Procedures:  Tx Min Billable or 1:1 Min (if diff from Tx Min) Procedure, Rationale, Specifics   10 10 [id number] Neuromuscular Re-Education (timed):  improve balance, coordination, kinesthetic sense, posture, core stability and proprioception to improve patient's ability to develop conscious control of individual muscles and awareness of position of extremities in order to progress to PLOF and address remaining functional goals. (see flow sheet as applicable)     Details if applicable:  TA, glute set   35 15 [id number] Therapeutic Exercise (timed):  increase ROM, strength, coordination, balance, and proprioception to

## 2024-10-29 ENCOUNTER — HOSPITAL ENCOUNTER (OUTPATIENT)
Facility: HOSPITAL | Age: 84
Setting detail: RECURRING SERIES
Discharge: HOME | End: 2024-11-01
Payer: MEDICARE

## 2024-10-29 PROCEDURE — 97110 THERAPEUTIC EXERCISES: CPT | Performed by: PHYSICAL THERAPIST

## 2024-10-29 PROCEDURE — 97112 NEUROMUSCULAR REEDUCATION: CPT | Performed by: PHYSICAL THERAPIST

## 2024-10-29 NOTE — PROGRESS NOTES
PHYSICAL THERAPY - MEDICARE DAILY TREATMENT NOTE (updated 3/23)      Date: 10/29/2024          Patient Name:  Chrissy Tierney :  1940   Medical   Diagnosis:  Back pain [M54.9] Treatment Diagnosis:  M54.59  OTHER LOWER BACK PAIN    Referral Source:  Roberto Carlos Licona MD Insurance:   Payor: MEDICARE / Plan: MEDICARE PART A AND B / Product Type: *No Product type* /                     Patient  verified yes     Visit #   Current  / Total 7 MN   Time   In / Out 1227  PM   Total Treatment Time 48   Total Timed Codes 48   1:1 Treatment Time 48      [facility] Totals Reminder:  bill using total billable   min of TIMED therapeutic procedures and modalities.   8-22 min = 1 unit; 23-37 min = 2 units; 38-52 min = 3 units; 53-67 min = 4 units; 68-82 min = 5 units            SUBJECTIVE    Pain Level (0-10 scale): 1/10 \"ache\"    Any medication changes, allergies to medications, adverse drug reactions, diagnosis change, or new procedure performed?: [x] No    [] Yes (see summary sheet for update)  Medications: Verified on Patient Summary List    Subjective functional status/changes:     Pt reports she continues to feel good with just small ache in low back.     OBJECTIVE      Therapeutic Procedures:  Tx Min Billable or 1:1 Min (if diff from Tx Min) Procedure, Rationale, Specifics   10  67396 Neuromuscular Re-Education (timed):  improve balance, coordination, kinesthetic sense, posture, core stability and proprioception to improve patient's ability to develop conscious control of individual muscles and awareness of position of extremities in order to progress to PLOF and address remaining functional goals. (see flow sheet as applicable)     Details if applicable:  TA, glute set   38  07781 Therapeutic Exercise (timed):  increase ROM, strength, coordination, balance, and proprioception to improve patient's ability to progress to PLOF and address remaining functional goals. (see flow sheet as applicable)     Details if

## 2024-10-31 ENCOUNTER — HOSPITAL ENCOUNTER (OUTPATIENT)
Facility: HOSPITAL | Age: 84
Setting detail: RECURRING SERIES
Discharge: HOME | End: 2024-11-03
Payer: MEDICARE

## 2024-10-31 PROCEDURE — 97110 THERAPEUTIC EXERCISES: CPT | Performed by: PHYSICAL THERAPIST

## 2024-10-31 PROCEDURE — 97112 NEUROMUSCULAR REEDUCATION: CPT | Performed by: PHYSICAL THERAPIST

## 2024-10-31 NOTE — PROGRESS NOTES
PHYSICAL THERAPY - MEDICARE DAILY TREATMENT NOTE (updated 3/23)      Date: 10/31/2024          Patient Name:  Chrissy Tierney :  1940   Medical   Diagnosis:  Back pain [M54.9] Treatment Diagnosis:  M54.59  OTHER LOWER BACK PAIN    Referral Source:  Roberto Carlos Licona MD Insurance:   Payor: MEDICARE / Plan: MEDICARE PART A AND B / Product Type: *No Product type* /                     Patient  verified yes     Visit #   Current  / Total 8 MN   Time   In / Out 1100 AM 1140 AM   Total Treatment Time 40   Total Timed Codes 40   1:1 Treatment Time 40      [facility] Totals Reminder:  bill using total billable   min of TIMED therapeutic procedures and modalities.   8-22 min = 1 unit; 23-37 min = 2 units; 38-52 min = 3 units; 53-67 min = 4 units; 68-82 min = 5 units            SUBJECTIVE    Pain Level (0-10 scale): 0/10    Any medication changes, allergies to medications, adverse drug reactions, diagnosis change, or new procedure performed?: [x] No    [] Yes (see summary sheet for update)  Medications: Verified on Patient Summary List    Subjective functional status/changes:     Pt reports her back feels great today.     OBJECTIVE      Therapeutic Procedures:  Tx Min Billable or 1:1 Min (if diff from Tx Min) Procedure, Rationale, Specifics   10  51774 Neuromuscular Re-Education (timed):  improve balance, coordination, kinesthetic sense, posture, core stability and proprioception to improve patient's ability to develop conscious control of individual muscles and awareness of position of extremities in order to progress to PLOF and address remaining functional goals. (see flow sheet as applicable)     Details if applicable:  TA, glute set   30  48069 Therapeutic Exercise (timed):  increase ROM, strength, coordination, balance, and proprioception to improve patient's ability to progress to PLOF and address remaining functional goals. (see flow sheet as applicable)     Details if applicable:  added per flow sheet   40      Total Total         Modalities Rationale:     decrease inflammation and decrease pain to improve patient's ability to progress to PLOF and address remaining functional goals.       min [] Estim Unattended,             type/location:       []  w/ice    []  w/heat        min [] Estim Attended,             type/location:       []  w/ice   []  w/heat         []  w/US   []  TENS insruct            min []  Mechanical Traction,        type/lbs:        []  pro      []  sup           []  int       []  cont            []  before manual           []  after manual     min []  Ultrasound,         settings/location:     declined min  unbilled [x]  Ice     []  Heat            location/position: low back/supine         min []  Vasopneumatic Device,      press/temp:   pre-treatment girth :    post-treatment girth :    measured at (landmark       location) :   If using vaso (only need to measure limb vaso being performed on)        min []  Other:      Skin assessment post-treatment (if applicable):    [x]  intact    []  redness- no adverse reaction                 []redness - adverse reaction:          [x]  Patient Education billed concurrently with other procedures   [x] Review HEP    [] Progressed/Changed HEP, detail:    [] Other detail:         Other Objective/Functional Measures      Pain Level at end of session (0-10 scale): 0/10      Assessment   Pt continues to participate well with skilled PT services progressing with strengthening without increased pain.  Patient will continue to benefit from skilled PT / OT services to modify and progress therapeutic interventions, analyze and address functional mobility deficits, analyze and address ROM deficits, analyze and address strength deficits, analyze and address soft tissue restrictions, analyze and cue for proper movement patterns, analyze and modify for postural abnormalities, and analyze and address imbalance/dizziness to address functional deficits and attain remaining

## 2024-11-05 ENCOUNTER — HOSPITAL ENCOUNTER (OUTPATIENT)
Facility: HOSPITAL | Age: 84
Setting detail: RECURRING SERIES
Discharge: HOME | End: 2024-11-08
Payer: MEDICARE

## 2024-11-05 PROCEDURE — 97112 NEUROMUSCULAR REEDUCATION: CPT | Performed by: PHYSICAL THERAPIST

## 2024-11-05 PROCEDURE — 97110 THERAPEUTIC EXERCISES: CPT | Performed by: PHYSICAL THERAPIST

## 2024-11-05 NOTE — PROGRESS NOTES
Updated goals:  []  See Progress Note/Recertification    NT      PLAN  Yes  Continue plan of care  Re-Cert Due: 1/7/24  []  Upgrade activities as tolerated  []  Discharge due to:  []  Other:      Lizzeth Espitia, PT       11/5/2024       12:36 PM

## 2024-11-07 ENCOUNTER — HOSPITAL ENCOUNTER (OUTPATIENT)
Facility: HOSPITAL | Age: 84
Setting detail: RECURRING SERIES
Discharge: HOME | End: 2024-11-10
Payer: MEDICARE

## 2024-11-07 PROCEDURE — 97112 NEUROMUSCULAR REEDUCATION: CPT | Performed by: PHYSICAL THERAPIST

## 2024-11-07 PROCEDURE — 97110 THERAPEUTIC EXERCISES: CPT | Performed by: PHYSICAL THERAPIST

## 2024-11-07 NOTE — PROGRESS NOTES
Physical Therapy at [facility],   a part of [facility]  [address] [address]  Phone: [phone]  Fax: [phone]     PHYSICAL THERAPY PROGRESS NOTE  Patient Name:  Chrissy Tierney :  1940   Treatment/Medical Diagnosis: Back pain [M54.9]   Referral Source:  Roberto Carlos Licona MD     Date of Initial Visit:  10/8/24 Attended Visits:  10 Missed Visits:  0     SUMMARY OF TREATMENT/ASSESSMENT:  Pt has been seen for 10 skilled PT sessions s/p lumbar fusion. She has made good improvements with strengthening. She will be 6 wks post op next week and able to progress with gentle lumbar AROM.     Patient will continue to benefit from skilled PT / OT services to modify and progress therapeutic interventions, analyze and address functional mobility deficits, analyze and address ROM deficits, analyze and address strength deficits, analyze and address soft tissue restrictions, analyze and cue for proper movement patterns, analyze and modify for postural abnormalities, and analyze and address imbalance/dizziness to address functional deficits and attain remaining goals.  CURRENT STATUS/GOALS:    LOWER QUARTER                            MUSCLE STRENGTH  KEY                                                                             R                      L  0 - No Contraction                   L1, L2 Psoas               4+                    4+  1 - Trace                                  L3 Quads                    5                      5  2 - Poor                                   L4 Tib Ant                    4 +                     4+  3 - Fair                                     L5 EHL                        NT                   NT  4 - Good                                  S1 Peroneals              NT                   NT  5 - Normal                               S2 Hams                     5                      4+     Flexibility: tight hamstrings  Mobility 
remaining functional goals. (see flow sheet as applicable)     Details if applicable:  added per flow sheet   50 40    Total Total         Modalities Rationale:     decrease inflammation and decrease pain to improve patient's ability to progress to PLOF and address remaining functional goals.       min [] Estim Unattended,             type/location:       []  w/ice    []  w/heat        min [] Estim Attended,             type/location:       []  w/ice   []  w/heat         []  w/US   []  TENS insruct            min []  Mechanical Traction,        type/lbs:        []  pro      []  sup           []  int       []  cont            []  before manual           []  after manual     min []  Ultrasound,         settings/location:     declined min  unbilled [x]  Ice     []  Heat            location/position: low back/supine         min []  Vasopneumatic Device,      press/temp:   pre-treatment girth :    post-treatment girth :    measured at (landmark       location) :   If using vaso (only need to measure limb vaso being performed on)        min []  Other:      Skin assessment post-treatment (if applicable):    [x]  intact    []  redness- no adverse reaction                 []redness - adverse reaction:          [x]  Patient Education billed concurrently with other procedures   [x] Review HEP    [] Progressed/Changed HEP, detail:    [] Other detail:         Other Objective/Functional Measures  LOWER QUARTER                            MUSCLE STRENGTH  KEY                                                                             R                      L  0 - No Contraction                   L1, L2 Psoas               4+                    4+  1 - Trace                                  L3 Quads                    5                      5  2 - Poor                                   L4 Tib Ant                    4 +                     4+  3 - Fair                                     L5 EHL                        NT

## 2024-11-12 ENCOUNTER — HOSPITAL ENCOUNTER (OUTPATIENT)
Facility: HOSPITAL | Age: 84
Setting detail: RECURRING SERIES
Discharge: HOME | End: 2024-11-15
Payer: MEDICARE

## 2024-11-12 PROCEDURE — 97112 NEUROMUSCULAR REEDUCATION: CPT | Performed by: PHYSICAL THERAPIST

## 2024-11-12 PROCEDURE — 97110 THERAPEUTIC EXERCISES: CPT | Performed by: PHYSICAL THERAPIST

## 2024-11-12 NOTE — PROGRESS NOTES
Modalities Rationale:     decrease inflammation and decrease pain to improve patient's ability to progress to PLOF and address remaining functional goals.       min [] Estim Unattended,             type/location:       []  w/ice    []  w/heat        min [] Estim Attended,             type/location:       []  w/ice   []  w/heat         []  w/US   []  TENS insruct            min []  Mechanical Traction,        type/lbs:        []  pro      []  sup           []  int       []  cont            []  before manual           []  after manual     min []  Ultrasound,         settings/location:     declined min  unbilled [x]  Ice     []  Heat            location/position: low back/supine         min []  Vasopneumatic Device,      press/temp:   pre-treatment girth :    post-treatment girth :    measured at (landmark       location) :   If using vaso (only need to measure limb vaso being performed on)        min []  Other:      Skin assessment post-treatment (if applicable):    [x]  intact    []  redness- no adverse reaction                 []redness - adverse reaction:          [x]  Patient Education billed concurrently with other procedures   [x] Review HEP    [] Progressed/Changed HEP, detail:    [] Other detail:         Other Objective/Functional Measures    Pain Level at end of session (0-10 scale): 0/10      Assessment   Pt able to initiate gentle lumbar ROM exercises as she is now 6 wks post op without increased symptoms. Pt reported feeling much looser and better following therapy.   Patient will continue to benefit from skilled PT / OT services to modify and progress therapeutic interventions, analyze and address functional mobility deficits, analyze and address ROM deficits, analyze and address strength deficits, analyze and address soft tissue restrictions, analyze and cue for proper movement patterns, analyze and modify for postural abnormalities, and analyze and address imbalance/dizziness to address

## 2024-11-14 ENCOUNTER — HOSPITAL ENCOUNTER (OUTPATIENT)
Facility: HOSPITAL | Age: 84
Setting detail: RECURRING SERIES
Discharge: HOME | End: 2024-11-17
Payer: MEDICARE

## 2024-11-14 PROCEDURE — 97112 NEUROMUSCULAR REEDUCATION: CPT | Performed by: PHYSICAL THERAPIST

## 2024-11-14 PROCEDURE — 97110 THERAPEUTIC EXERCISES: CPT | Performed by: PHYSICAL THERAPIST

## 2024-11-14 NOTE — PROGRESS NOTES
PHYSICAL THERAPY - MEDICARE DAILY TREATMENT NOTE (updated 3/23)      Date: 2024          Patient Name:  Chrissy Tierney :  1940   Medical   Diagnosis:  Back pain [M54.9] Treatment Diagnosis:  M54.59  OTHER LOWER BACK PAIN    Referral Source:  Roberto Carlos Licona MD Insurance:   Payor: MEDICARE / Plan: MEDICARE PART A AND B / Product Type: *No Product type* /                     Patient  verified yes     Visit #   Current  / Total 12 MN   Time   In / Out 1226  PM   Total Treatment Time 44   Total Timed Codes 44   1:1 Treatment Time 44      [facility] Totals Reminder:  bill using total billable   min of TIMED therapeutic procedures and modalities.   8-22 min = 1 unit; 23-37 min = 2 units; 38-52 min = 3 units; 53-67 min = 4 units; 68-82 min = 5 units            SUBJECTIVE    Pain Level (0-10 scale): 0/10    Any medication changes, allergies to medications, adverse drug reactions, diagnosis change, or new procedure performed?: [x] No    [] Yes (see summary sheet for update)  Medications: Verified on Patient Summary List    Subjective functional status/changes:     Pt reports she wasn't sore following progressions previous visit.     OBJECTIVE      Therapeutic Procedures:  Tx Min Billable or 1:1 Min (if diff from Tx Min) Procedure, Rationale, Specifics   10  74952 Neuromuscular Re-Education (timed):  improve balance, coordination, kinesthetic sense, posture, core stability and proprioception to improve patient's ability to develop conscious control of individual muscles and awareness of position of extremities in order to progress to PLOF and address remaining functional goals. (see flow sheet as applicable)     Details if applicable:  TA, glute set   34  16029 Therapeutic Exercise (timed):  increase ROM, strength, coordination, balance, and proprioception to improve patient's ability to progress to PLOF and address remaining functional goals. (see flow sheet as applicable)     Details if applicable:

## 2024-11-19 ENCOUNTER — HOSPITAL ENCOUNTER (OUTPATIENT)
Facility: HOSPITAL | Age: 84
Setting detail: RECURRING SERIES
Discharge: HOME | End: 2024-11-22
Payer: MEDICARE

## 2024-11-19 PROCEDURE — 97110 THERAPEUTIC EXERCISES: CPT | Performed by: PHYSICAL THERAPIST

## 2024-11-19 PROCEDURE — 97112 NEUROMUSCULAR REEDUCATION: CPT | Performed by: PHYSICAL THERAPIST

## 2024-11-19 NOTE — PROGRESS NOTES
PHYSICAL THERAPY - MEDICARE DAILY TREATMENT NOTE (updated 3/23)      Date: 2024          Patient Name:  Chrissy Tierney :  1940   Medical   Diagnosis:  Back pain [M54.9] Treatment Diagnosis:  M54.59  OTHER LOWER BACK PAIN    Referral Source:  Roberto Carlos Licona MD Insurance:   Payor: MEDICARE / Plan: MEDICARE PART A AND B / Product Type: *No Product type* /                     Patient  verified yes     Visit #   Current  / Total 13 MN   Time   In / Out 1218  PM   Total Treatment Time 42   Total Timed Codes 42   1:1 Treatment Time 42      [facility] Totals Reminder:  bill using total billable   min of TIMED therapeutic procedures and modalities.   8-22 min = 1 unit; 23-37 min = 2 units; 38-52 min = 3 units; 53-67 min = 4 units; 68-82 min = 5 units            SUBJECTIVE    Pain Level (0-10 scale): 0/10    Any medication changes, allergies to medications, adverse drug reactions, diagnosis change, or new procedure performed?: [x] No    [] Yes (see summary sheet for update)  Medications: Verified on Patient Summary List    Subjective functional status/changes:     Pt reports she saw MD and he was happy with her progress.     OBJECTIVE      Therapeutic Procedures:  Tx Min Billable or 1:1 Min (if diff from Tx Min) Procedure, Rationale, Specifics   10  95325 Neuromuscular Re-Education (timed):  improve balance, coordination, kinesthetic sense, posture, core stability and proprioception to improve patient's ability to develop conscious control of individual muscles and awareness of position of extremities in order to progress to PLOF and address remaining functional goals. (see flow sheet as applicable)     Details if applicable:  TA, glute set   32  87146 Therapeutic Exercise (timed):  increase ROM, strength, coordination, balance, and proprioception to improve patient's ability to progress to PLOF and address remaining functional goals. (see flow sheet as applicable)     Details if applicable:  added per

## 2024-11-21 ENCOUNTER — HOSPITAL ENCOUNTER (OUTPATIENT)
Facility: HOSPITAL | Age: 84
Setting detail: RECURRING SERIES
Discharge: HOME | End: 2024-11-24
Payer: MEDICARE

## 2024-11-21 PROCEDURE — 97112 NEUROMUSCULAR REEDUCATION: CPT | Performed by: PHYSICAL THERAPIST

## 2024-11-21 PROCEDURE — 97110 THERAPEUTIC EXERCISES: CPT | Performed by: PHYSICAL THERAPIST

## 2024-11-21 NOTE — THERAPY DISCHARGE
Physical Therapy at [facility],   a part of [facility]  [address] [address]  Phone: [phone]  Fax: [phone]     DISCHARGE SUMMARY  Patient Name: Chrissy Tierney : 1940   Treatment/Medical Diagnosis: Back pain [M54.9]   Referral Source: Roberto Carlos Licona MD     Date of Initial Visit: 10/8/24 Attended Visits: 14 Missed Visits: 0     SUMMARY OF TREATMENT  Pt attended 14 skilled PT sessions s/p L3-L5 fusion. She is independently with transfers and ambulation. Performing all ADLs and previous activities with no pain. She has met all goals at this time and reached maximal benefit from skilled PT services. Good understanding of HEP.     CURRENT STATUS  94/100 FOTO    Progress toward goals / Updated goals:  []  See Progress Note/Recertification    Short Term Goals: To be accomplished in 3-4 treatments.  Pt will be ind with HEP. Met  Pt will follow precautions and brace wear per MD. Met  Long Term Goals: To be accomplished in 24 treatments.  Pt will be able to walk for 30 min without increased pain. Met  Pt will be able to perform light household chores without increased pain. Met  Pt will navigate 4 steps with reciprocal gait pattern and no loss of balance.  Met  Pt will improve FOTO by 10 pts for improved function. Met      RECOMMENDATIONS  Discontinue therapy. Progressing towards or have reached established goals.        Lizzeth Espitia, PT       2024       3:15 PM    If you have any questions/comments please contact us directly at [phone].   Thank you for allowing us to assist in the care of your patient.

## 2024-11-21 NOTE — PROGRESS NOTES
PHYSICAL THERAPY - MEDICARE DAILY TREATMENT NOTE (updated 3/23)      Date: 2024          Patient Name:  Chrissy Tierney :  1940   Medical   Diagnosis:  Back pain [M54.9] Treatment Diagnosis:  M54.59  OTHER LOWER BACK PAIN    Referral Source:  Roberto Carlos Licona MD Insurance:   Payor: MEDICARE / Plan: MEDICARE PART A AND B / Product Type: *No Product type* /                     Patient  verified yes     Visit #   Current  / Total 14 MN   Time   In / Out 1225  PM   Total Treatment Time 35   Total Timed Codes 35   1:1 Treatment Time 25      [facility] Totals Reminder:  bill using total billable   min of TIMED therapeutic procedures and modalities.   8-22 min = 1 unit; 23-37 min = 2 units; 38-52 min = 3 units; 53-67 min = 4 units; 68-82 min = 5 units            SUBJECTIVE    Pain Level (0-10 scale): 0/10    Any medication changes, allergies to medications, adverse drug reactions, diagnosis change, or new procedure performed?: [x] No    [] Yes (see summary sheet for update)  Medications: Verified on Patient Summary List    Subjective functional status/changes:     Pt reports that she feels she is doing great. The only activity she has not done is her stretches which MD told her to hold on.     OBJECTIVE      Therapeutic Procedures:  Tx Min Billable or 1:1 Min (if diff from Tx Min) Procedure, Rationale, Specifics   10 10 93805 Neuromuscular Re-Education (timed):  improve balance, coordination, kinesthetic sense, posture, core stability and proprioception to improve patient's ability to develop conscious control of individual muscles and awareness of position of extremities in order to progress to PLOF and address remaining functional goals. (see flow sheet as applicable)     Details if applicable:  TA, glute set   25 15 48277 Therapeutic Exercise (timed):  increase ROM, strength, coordination, balance, and proprioception to improve patient's ability to progress to PLOF and address remaining functional

## 2025-02-25 ENCOUNTER — OFFICE VISIT (OUTPATIENT)
Age: 85
End: 2025-02-25
Payer: MEDICARE

## 2025-02-25 VITALS
OXYGEN SATURATION: 95 % | TEMPERATURE: 98.5 F | SYSTOLIC BLOOD PRESSURE: 165 MMHG | BODY MASS INDEX: 19.14 KG/M2 | WEIGHT: 115 LBS | RESPIRATION RATE: 18 BRPM | HEART RATE: 121 BPM | DIASTOLIC BLOOD PRESSURE: 116 MMHG

## 2025-02-25 VITALS
TEMPERATURE: 98 F | HEART RATE: 92 BPM | BODY MASS INDEX: 19.16 KG/M2 | OXYGEN SATURATION: 98 % | HEIGHT: 65 IN | DIASTOLIC BLOOD PRESSURE: 81 MMHG | WEIGHT: 115 LBS | SYSTOLIC BLOOD PRESSURE: 129 MMHG

## 2025-02-25 DIAGNOSIS — M19.90 ARTHRITIS: ICD-10-CM

## 2025-02-25 DIAGNOSIS — I10 ESSENTIAL (PRIMARY) HYPERTENSION: Primary | ICD-10-CM

## 2025-02-25 DIAGNOSIS — R73.9 HYPERGLYCEMIA: ICD-10-CM

## 2025-02-25 DIAGNOSIS — Z85.3 HISTORY OF LEFT BREAST CANCER: ICD-10-CM

## 2025-02-25 DIAGNOSIS — E55.9 VITAMIN D DEFICIENCY, UNSPECIFIED: ICD-10-CM

## 2025-02-25 DIAGNOSIS — Z17.0 MALIGNANT NEOPLASM OF BREAST IN FEMALE, ESTROGEN RECEPTOR POSITIVE, UNSPECIFIED LATERALITY, UNSPECIFIED SITE OF BREAST (HCC): Primary | ICD-10-CM

## 2025-02-25 DIAGNOSIS — Z90.12 HISTORY OF LEFT MASTECTOMY: ICD-10-CM

## 2025-02-25 DIAGNOSIS — Z12.31 BREAST CANCER SCREENING BY MAMMOGRAM: ICD-10-CM

## 2025-02-25 DIAGNOSIS — E78.2 MIXED HYPERLIPIDEMIA: ICD-10-CM

## 2025-02-25 DIAGNOSIS — C50.919 MALIGNANT NEOPLASM OF BREAST IN FEMALE, ESTROGEN RECEPTOR POSITIVE, UNSPECIFIED LATERALITY, UNSPECIFIED SITE OF BREAST (HCC): Primary | ICD-10-CM

## 2025-02-25 DIAGNOSIS — C50.912 RECURRENT BREAST CANCER, LEFT (HCC): ICD-10-CM

## 2025-02-25 PROCEDURE — 1123F ACP DISCUSS/DSCN MKR DOCD: CPT | Performed by: NURSE PRACTITIONER

## 2025-02-25 PROCEDURE — G8427 DOCREV CUR MEDS BY ELIG CLIN: HCPCS | Performed by: NURSE PRACTITIONER

## 2025-02-25 PROCEDURE — 99213 OFFICE O/P EST LOW 20 MIN: CPT | Performed by: NURSE PRACTITIONER

## 2025-02-25 PROCEDURE — 1036F TOBACCO NON-USER: CPT | Performed by: NURSE PRACTITIONER

## 2025-02-25 PROCEDURE — 99214 OFFICE O/P EST MOD 30 MIN: CPT | Performed by: INTERNAL MEDICINE

## 2025-02-25 PROCEDURE — 1159F MED LIST DOCD IN RCRD: CPT | Performed by: NURSE PRACTITIONER

## 2025-02-25 PROCEDURE — 1160F RVW MEDS BY RX/DR IN RCRD: CPT | Performed by: NURSE PRACTITIONER

## 2025-02-25 PROCEDURE — G8427 DOCREV CUR MEDS BY ELIG CLIN: HCPCS | Performed by: INTERNAL MEDICINE

## 2025-02-25 PROCEDURE — 1090F PRES/ABSN URINE INCON ASSESS: CPT | Performed by: INTERNAL MEDICINE

## 2025-02-25 PROCEDURE — 1090F PRES/ABSN URINE INCON ASSESS: CPT | Performed by: NURSE PRACTITIONER

## 2025-02-25 PROCEDURE — G8420 CALC BMI NORM PARAMETERS: HCPCS | Performed by: INTERNAL MEDICINE

## 2025-02-25 PROCEDURE — G8400 PT W/DXA NO RESULTS DOC: HCPCS | Performed by: NURSE PRACTITIONER

## 2025-02-25 PROCEDURE — 1159F MED LIST DOCD IN RCRD: CPT | Performed by: INTERNAL MEDICINE

## 2025-02-25 PROCEDURE — 1036F TOBACCO NON-USER: CPT | Performed by: INTERNAL MEDICINE

## 2025-02-25 PROCEDURE — 1160F RVW MEDS BY RX/DR IN RCRD: CPT | Performed by: INTERNAL MEDICINE

## 2025-02-25 PROCEDURE — G8400 PT W/DXA NO RESULTS DOC: HCPCS | Performed by: INTERNAL MEDICINE

## 2025-02-25 PROCEDURE — G8420 CALC BMI NORM PARAMETERS: HCPCS | Performed by: NURSE PRACTITIONER

## 2025-02-25 PROCEDURE — 3074F SYST BP LT 130 MM HG: CPT | Performed by: INTERNAL MEDICINE

## 2025-02-25 PROCEDURE — 3079F DIAST BP 80-89 MM HG: CPT | Performed by: INTERNAL MEDICINE

## 2025-02-25 PROCEDURE — 1123F ACP DISCUSS/DSCN MKR DOCD: CPT | Performed by: INTERNAL MEDICINE

## 2025-02-25 PROCEDURE — 1126F AMNT PAIN NOTED NONE PRSNT: CPT | Performed by: NURSE PRACTITIONER

## 2025-02-25 RX ORDER — METOPROLOL SUCCINATE 25 MG/1
25 TABLET, EXTENDED RELEASE ORAL DAILY
Qty: 90 TABLET | Refills: 1 | Status: SHIPPED | OUTPATIENT
Start: 2025-02-25

## 2025-02-25 RX ORDER — SPIRONOLACTONE 25 MG/1
25 TABLET ORAL DAILY
Qty: 90 TABLET | Refills: 1 | Status: SHIPPED | OUTPATIENT
Start: 2025-02-25

## 2025-02-25 RX ORDER — ATORVASTATIN CALCIUM 10 MG/1
10 TABLET, FILM COATED ORAL DAILY
Qty: 90 TABLET | Refills: 1 | Status: SHIPPED | OUTPATIENT
Start: 2025-02-25

## 2025-02-25 SDOH — ECONOMIC STABILITY: FOOD INSECURITY: WITHIN THE PAST 12 MONTHS, THE FOOD YOU BOUGHT JUST DIDN'T LAST AND YOU DIDN'T HAVE MONEY TO GET MORE.: NEVER TRUE

## 2025-02-25 SDOH — ECONOMIC STABILITY: FOOD INSECURITY: WITHIN THE PAST 12 MONTHS, YOU WORRIED THAT YOUR FOOD WOULD RUN OUT BEFORE YOU GOT MONEY TO BUY MORE.: NEVER TRUE

## 2025-02-25 ASSESSMENT — PATIENT HEALTH QUESTIONNAIRE - PHQ9
SUM OF ALL RESPONSES TO PHQ QUESTIONS 1-9: 0
SUM OF ALL RESPONSES TO PHQ QUESTIONS 1-9: 0
2. FEELING DOWN, DEPRESSED OR HOPELESS: NOT AT ALL
1. LITTLE INTEREST OR PLEASURE IN DOING THINGS: NOT AT ALL
SUM OF ALL RESPONSES TO PHQ QUESTIONS 1-9: 0
SUM OF ALL RESPONSES TO PHQ QUESTIONS 1-9: 0
SUM OF ALL RESPONSES TO PHQ9 QUESTIONS 1 & 2: 0

## 2025-02-25 ASSESSMENT — ENCOUNTER SYMPTOMS
COLOR CHANGE: 0
CHEST TIGHTNESS: 0
ABDOMINAL PAIN: 0
FACIAL SWELLING: 0
COUGH: 0
VOMITING: 0
WHEEZING: 0
SORE THROAT: 0
RHINORRHEA: 0
NAUSEA: 0
SHORTNESS OF BREATH: 0

## 2025-02-25 NOTE — PROGRESS NOTES
Chrissy MARIN Tierney is a 85 y.o. female    Chief Complaint   Patient presents with    Follow-up     Left Breast Cancer       1. Have you been to the ER, urgent care clinic since your last visit?  Hospitalized since your last visit?No    2. Have you seen or consulted any other health care providers outside of the [facility] since your last visit?  Include any pap smears or colon screening. No

## 2025-02-25 NOTE — PROGRESS NOTES
Chief Complaint   Patient presents with    New Patient     New patient. Last visit with previous PCP was about 6 months ago. Medical history as noted. Patient has eaten light today. Has had a flu shot already this season.         1. \"Have you been to the ER or a urgent care clinic since your last visit?  Hospitalized since your last visit?\"  no      2. \"Have you seen or consulted any other health care providers outside of the [facility] since your last visit?\"  no         3. For patients aged 45-75: Has the patient had a colonoscopy / FIT/ Cologuard? N/a      If the patient is female:    4.For patients aged 40-74: Has the patient had a mammogram within the past 2 years? Mammogram Result (most recent):  [facility] DIGITAL SCREEN UNILATERAL RIGHT 09/10/2024    Narrative  STUDY: Right unilateral digital screening mammogram with 3-D tomosynthesis    INDICATION:  Screening.    COMPARISON: Prior studies dating back to 2015    BREAST COMPOSITION: There are scattered areas of fibroglandular density.    FINDINGS: Right unilateral digital screening mammography was performed and is  interpreted in conjunction with a computer assisted detection (CAD) system.  Additionally, tomosynthesis of the right breast in the CC and MLO projections  was performed. No suspicious masses or calcifications are identified. There has  been no significant change.    Impression  BI-RADS 1: Negative. No mammographic evidence of malignancy.    RECOMMENDATIONS:  Next screening mammogram is recommended in one year.    The patient will be notified of these results.    Electronically signed by Stu Uriostegui MD    Performing Facility: [facility] [address] [place] [address]. [address] [address] Phone: [phone]       5. For patients aged 60 and over last BMD study?: None on file       6. For patients aged 21-65: Has the patient had a pap smear? N/a    Click Here for Release of Records

## 2025-02-25 NOTE — PROGRESS NOTES
Check  Chrissy Tierney (:  1940) is a 85 y.o. female,New patient, here for evaluation of the following chief complaint(s):   Chief Complaint   Patient presents with    New Patient     New patient. Last visit with previous PCP was about 6 months ago. Medical history as noted. Patient has eaten light today. Has had a flu shot already this season.       HPI   Subjective   SUBJECTIVE/OBJECTIVE  HPI : Patient is here to establish care with us, new to the practice.  Past medical history significant for hypertension, dyslipidemia, history of left breast cancer.  Patient also needs routine labs and medication refills.  General review  Patient has had recurrent left breast cancer with left sided mastectomy over 3 years ago.  Patient sees her oncologist Dr. Cooney every 6 months.  Is not on any chemo meds at this time.  Overall feels well.  Cardiovascular review  Patient has had hypertension and dyslipidemia for over 20 years, has been taking spironolactone 50 mg and metoprolol XL 25 mg daily per her previous PCP.  States she stopped the metoprolol few months ago, per his advice.  Denies any chest pains palpitations or shortness of breath.  Denies any headaches dizziness nausea blurred vision or swelling in the feet.  Also has been taking her atorvastatin 10 g daily with no side effects.  Last lipid panel was 6 months ago.      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 Left breast cancer treated with surgery and radiation.    History of therapeutic radiation      following left breast lumpectomy    Hyperlipemia     Hypertension     S/P radiation therapy     Vaginal delivery     Four times.  Four episiotomies.          Medications     Current Outpatient Medications:     spironolactone (ALDACTONE) 25 MG tablet, Take 1

## 2025-02-25 NOTE — PROGRESS NOTES
[facility] [place] [facility]  [address], [address] [place] [address]  W: [phone]  F: [phone]    Reason for Visit:   Chrissy Tierney is a 85 y.o. female seen today in office for follow up of Left Breast Cancer.    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 AK 97%, Her2 2+, Ki-67 10% FISH negative  3/28/22 - LEFT Breast Mastectomy and SLN Biopsy: PATH - T2 N0 multicentric disease   Adjuvant hormonal therapy with Anastrozole 4/22 - 8/23 stopped due to hair loss     STAGE pT2pN0 multicentric     History of Present Illness:   Chrissy Tierney is a 85 y.o. female seen today in [facility] for 6 month follow up of up ER+ Her2 Negative Left Breast Cancer with Hx of Left Mastectomy. She is not on adjuvant hormonal therapy by choice due to hair loss. She reports that she feels well overall today - nothing new or different. Her appetite and energy levels are good. She remains active; enjoys gardening. She denies fever, chills, mouth sores, cough, SOB, CP, nausea, vomiting, diarrhea, and constipation. She denies pain today. She is here alone today.     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 Left breast cancer treated with surgery and radiation.    History of therapeutic radiation     1997 following left breast lumpectomy    Hyperlipemia     Hypertension     S/P radiation therapy 1997    Vaginal delivery     Four times.  Four episiotomies.      Past Surgical History:   Procedure Laterality Date    APPENDECTOMY      BACK SURGERY      Due to nerve compression of L spine, metal aryan placed along L-spine    BREAST BIOPSY Left 2005    stereo  benign    BREAST BIOPSY Left 02/15/2022    US guided

## 2025-02-27 LAB
25(OH)D3 SERPL-MCNC: 38.4 NG/ML (ref 30–100)
ALBUMIN SERPL-MCNC: 4.2 G/DL (ref 3.5–5)
ALBUMIN/GLOB SERPL: 1.4 (ref 1.1–2.2)
ALP SERPL-CCNC: 73 U/L (ref 45–117)
ALT SERPL-CCNC: 22 U/L (ref 12–78)
ANION GAP SERPL CALC-SCNC: 6 MMOL/L (ref 2–12)
AST SERPL-CCNC: 19 U/L (ref 15–37)
BASOPHILS # BLD: 0.02 K/UL (ref 0–0.1)
BASOPHILS NFR BLD: 0.2 % (ref 0–1)
BILIRUB SERPL-MCNC: 0.3 MG/DL (ref 0.2–1)
BUN SERPL-MCNC: 15 MG/DL (ref 6–20)
BUN/CREAT SERPL: 19 (ref 12–20)
CALCIUM SERPL-MCNC: 10.2 MG/DL (ref 8.5–10.1)
CHLORIDE SERPL-SCNC: 105 MMOL/L (ref 97–108)
CHOLEST SERPL-MCNC: 214 MG/DL
CO2 SERPL-SCNC: 29 MMOL/L (ref 21–32)
CREAT SERPL-MCNC: 0.78 MG/DL (ref 0.55–1.02)
DIFFERENTIAL METHOD BLD: NORMAL
EOSINOPHIL # BLD: 0.07 K/UL (ref 0–0.4)
EOSINOPHIL NFR BLD: 0.8 % (ref 0–7)
ERYTHROCYTE [DISTWIDTH] IN BLOOD BY AUTOMATED COUNT: 12.7 % (ref 11.5–14.5)
EST. AVERAGE GLUCOSE BLD GHB EST-MCNC: 114 MG/DL
GLOBULIN SER CALC-MCNC: 3.1 G/DL (ref 2–4)
GLUCOSE SERPL-MCNC: 125 MG/DL (ref 65–100)
HBA1C MFR BLD: 5.6 % (ref 4–5.6)
HCT VFR BLD AUTO: 42.4 % (ref 35–47)
HDLC SERPL-MCNC: 73 MG/DL
HDLC SERPL: 2.9 (ref 0–5)
HGB BLD-MCNC: 13.6 G/DL (ref 11.5–16)
IMM GRANULOCYTES # BLD AUTO: 0.01 K/UL (ref 0–0.04)
IMM GRANULOCYTES NFR BLD AUTO: 0.1 % (ref 0–0.5)
LDLC SERPL CALC-MCNC: 120.6 MG/DL (ref 0–100)
LYMPHOCYTES # BLD: 2.16 K/UL (ref 0.8–3.5)
LYMPHOCYTES NFR BLD: 24.4 % (ref 12–49)
MCH RBC QN AUTO: 30.4 PG (ref 26–34)
MCHC RBC AUTO-ENTMCNC: 32.1 G/DL (ref 30–36.5)
MCV RBC AUTO: 94.9 FL (ref 80–99)
MONOCYTES # BLD: 0.53 K/UL (ref 0–1)
MONOCYTES NFR BLD: 6 % (ref 5–13)
NEUTS SEG # BLD: 6.08 K/UL (ref 1.8–8)
NEUTS SEG NFR BLD: 68.5 % (ref 32–75)
NRBC # BLD: 0 K/UL (ref 0–0.01)
NRBC BLD-RTO: 0 PER 100 WBC
PLATELET # BLD AUTO: 322 K/UL (ref 150–400)
PMV BLD AUTO: 9.8 FL (ref 8.9–12.9)
POTASSIUM SERPL-SCNC: 3.8 MMOL/L (ref 3.5–5.1)
PROT SERPL-MCNC: 7.3 G/DL (ref 6.4–8.2)
RBC # BLD AUTO: 4.47 M/UL (ref 3.8–5.2)
SODIUM SERPL-SCNC: 140 MMOL/L (ref 136–145)
T4 FREE SERPL-MCNC: 1.1 NG/DL (ref 0.8–1.5)
TRIGL SERPL-MCNC: 102 MG/DL
TSH SERPL DL<=0.05 MIU/L-ACNC: 1.33 UIU/ML (ref 0.36–3.74)
VLDLC SERPL CALC-MCNC: 20.4 MG/DL
WBC # BLD AUTO: 8.9 K/UL (ref 3.6–11)

## 2025-08-21 ENCOUNTER — TELEPHONE (OUTPATIENT)
Age: 85
End: 2025-08-21

## 2025-09-02 ENCOUNTER — OFFICE VISIT (OUTPATIENT)
Age: 85
End: 2025-09-02
Payer: MEDICARE

## 2025-09-02 VITALS
WEIGHT: 110 LBS | DIASTOLIC BLOOD PRESSURE: 85 MMHG | OXYGEN SATURATION: 95 % | SYSTOLIC BLOOD PRESSURE: 134 MMHG | TEMPERATURE: 98 F | BODY MASS INDEX: 18.3 KG/M2 | RESPIRATION RATE: 18 BRPM | HEART RATE: 116 BPM

## 2025-09-02 DIAGNOSIS — Z17.0 MALIGNANT NEOPLASM OF BREAST IN FEMALE, ESTROGEN RECEPTOR POSITIVE, UNSPECIFIED LATERALITY, UNSPECIFIED SITE OF BREAST (HCC): Primary | ICD-10-CM

## 2025-09-02 DIAGNOSIS — C50.919 MALIGNANT NEOPLASM OF BREAST IN FEMALE, ESTROGEN RECEPTOR POSITIVE, UNSPECIFIED LATERALITY, UNSPECIFIED SITE OF BREAST (HCC): Primary | ICD-10-CM

## 2025-09-02 DIAGNOSIS — Z12.31 BREAST CANCER SCREENING BY MAMMOGRAM: ICD-10-CM

## 2025-09-02 DIAGNOSIS — Z90.12 HISTORY OF LEFT MASTECTOMY: ICD-10-CM

## 2025-09-02 DIAGNOSIS — M19.90 ARTHRITIS: ICD-10-CM

## 2025-09-02 PROCEDURE — G8428 CUR MEDS NOT DOCUMENT: HCPCS | Performed by: STUDENT IN AN ORGANIZED HEALTH CARE EDUCATION/TRAINING PROGRAM

## 2025-09-02 PROCEDURE — G8400 PT W/DXA NO RESULTS DOC: HCPCS | Performed by: STUDENT IN AN ORGANIZED HEALTH CARE EDUCATION/TRAINING PROGRAM

## 2025-09-02 PROCEDURE — 1090F PRES/ABSN URINE INCON ASSESS: CPT | Performed by: STUDENT IN AN ORGANIZED HEALTH CARE EDUCATION/TRAINING PROGRAM

## 2025-09-02 PROCEDURE — 99213 OFFICE O/P EST LOW 20 MIN: CPT | Performed by: STUDENT IN AN ORGANIZED HEALTH CARE EDUCATION/TRAINING PROGRAM

## 2025-09-02 PROCEDURE — 1036F TOBACCO NON-USER: CPT | Performed by: STUDENT IN AN ORGANIZED HEALTH CARE EDUCATION/TRAINING PROGRAM

## 2025-09-02 PROCEDURE — G8419 CALC BMI OUT NRM PARAM NOF/U: HCPCS | Performed by: STUDENT IN AN ORGANIZED HEALTH CARE EDUCATION/TRAINING PROGRAM

## 2025-09-02 PROCEDURE — 1125F AMNT PAIN NOTED PAIN PRSNT: CPT | Performed by: STUDENT IN AN ORGANIZED HEALTH CARE EDUCATION/TRAINING PROGRAM

## 2025-09-02 PROCEDURE — 1123F ACP DISCUSS/DSCN MKR DOCD: CPT | Performed by: STUDENT IN AN ORGANIZED HEALTH CARE EDUCATION/TRAINING PROGRAM

## (undated) DEVICE — 1200 GUARD II KIT W/5MM TUBE W/O VAC TUBE: Brand: GUARDIAN

## (undated) DEVICE — SUT PROL 3-0 36IN SH DA BLU --

## (undated) DEVICE — GLOVE SURG SZ 75 L1212IN FNGR THK138MIL BRN LTX FREE

## (undated) DEVICE — SUTURE VCRL SZ 3-0 L27IN ABSRB UD L26MM SH 1/2 CIR J416H

## (undated) DEVICE — PACK,BASIC,SIRUS,V: Brand: MEDLINE

## (undated) DEVICE — UNDERPAD INCON STD 36X23IN --

## (undated) DEVICE — NEEDLE HYPO 22GA L1.5IN BLK POLYPR HUB S STL REG BVL STR

## (undated) DEVICE — PLASTICS CHEST BREAST ASU: Brand: MEDLINE INDUSTRIES, INC.

## (undated) DEVICE — SOLUTION IRRIG 1000ML 0.9% SOD CHL USP POUR PLAS BTL

## (undated) DEVICE — HANDLE LT SNAP ON ULT DURABLE LENS FOR TRUMPF ALC DISPOSABLE

## (undated) DEVICE — ABDOMINAL PAD: Brand: DERMACEA

## (undated) DEVICE — PENCIL SMK EVAC L10FT DIA95MM TBNG NONSTICK W ADPT TO 22MM

## (undated) DEVICE — DRESSING PETRO GZ XRFRM CURAD ST OVERWRAP 5 X 9 IN

## (undated) DEVICE — SUT CHRMC 3-0 27IN SH BRN --

## (undated) DEVICE — PAD,ABDOMINAL,5"X9",ST,LF,25/BX: Brand: MEDLINE INDUSTRIES, INC.

## (undated) DEVICE — TRAY PREP DRY W/ PREM GLV 2 APPL 6 SPNG 2 UNDPD 1 OVERWRAP

## (undated) DEVICE — SUT SLK 2-0SH 30IN BLK --

## (undated) DEVICE — INFECTION CONTROL KIT SYS

## (undated) DEVICE — SPONGE GZ W4XL4IN COT 12 PLY TYP VII WVN C FLD DSGN

## (undated) DEVICE — SURGICAL PROCEDURE PACK BASIN MAJ SET CUST NO CAUT

## (undated) DEVICE — MASTISOL ADHESIVE LIQ 2/3ML

## (undated) DEVICE — RESERVOIR,SUCTION,100CC,SILICONE: Brand: MEDLINE

## (undated) DEVICE — PILLOW POS AD L7IN R FOAM HD REST INTUB SLOT DISP

## (undated) DEVICE — Device

## (undated) DEVICE — SYR 10ML LUER LOK 1/5ML GRAD --

## (undated) DEVICE — INSULATED BLADE ELECTRODE: Brand: EDGE

## (undated) DEVICE — (D)SYR 10ML 1/5ML GRAD NSAF -- PKGING CHANGE USE ITEM 338027

## (undated) DEVICE — SUTURE MCRYL SZ 3-0 L18IN ABSRB UD L19MM PS-2 3/8 CIR PRIM Y497G

## (undated) DEVICE — SUTURE PDS II SZ 3-0 L27IN ABSRB VLT L26MM SH 1/2 CIR Z316H

## (undated) DEVICE — ROCKER SWITCH PENCIL BLADE ELECTRODE, HOLSTER: Brand: EDGE

## (undated) DEVICE — DBD-PACK,LAPAROTOMY,2 REINFORCED GOWNS: Brand: MEDLINE

## (undated) DEVICE — SUTURE VCRL SZ 3-0 L27IN ABSRB VLT L26MM SH 1/2 CIR J316H

## (undated) DEVICE — KENDALL SCD EXPRESS SLEEVES, KNEE LENGTH, MEDIUM: Brand: KENDALL SCD

## (undated) DEVICE — GARMENT,MEDLINE,DVT,INT,CALF,MED, GEN2: Brand: MEDLINE

## (undated) DEVICE — INTENDED FOR TISSUE SEPARATION, AND OTHER PROCEDURES THAT REQUIRE A SHARP SURGICAL BLADE TO PUNCTURE OR CUT.: Brand: BARD-PARKER ® CARBON RIB-BACK BLADES

## (undated) DEVICE — SPONGE LAP 18X18IN STRL -- 5/PK

## (undated) DEVICE — DRAIN SURG 19FR 100% SIL RADPQ RND CHN FULL FLUT

## (undated) DEVICE — 3M™ DURAPORE™ SURGICAL TAPE 1538-3, 3 INCH X 10 YARD (7,5CM X 9,1M), 4 ROLLS/BOX: Brand: 3M™ DURAPORE™

## (undated) DEVICE — NEEDLE HYPO 25GA L1.5IN BVL ORIENTED ECLIPSE

## (undated) DEVICE — GLOVE SURG SZ 6 L12IN FNGR THK79MIL GRN LTX FREE

## (undated) DEVICE — SOLUTION IV 1000ML 0.9% SOD CHL

## (undated) DEVICE — TOWEL SURG W17XL27IN STD BLU COT NONFENESTRATED PREWASHED

## (undated) DEVICE — DEVON™ KNEE AND BODY STRAP 60" X 3" (1.5 M X 7.6 CM): Brand: DEVON

## (undated) DEVICE — GAUZE SPONGES,12 PLY: Brand: CURITY

## (undated) DEVICE — BLADE ASSEMB CLP HAIR FINE --

## (undated) DEVICE — REM POLYHESIVE ADULT PATIENT RETURN ELECTRODE: Brand: VALLEYLAB